# Patient Record
Sex: FEMALE | Race: BLACK OR AFRICAN AMERICAN | NOT HISPANIC OR LATINO | URBAN - METROPOLITAN AREA
[De-identification: names, ages, dates, MRNs, and addresses within clinical notes are randomized per-mention and may not be internally consistent; named-entity substitution may affect disease eponyms.]

---

## 2017-03-01 PROBLEM — Z00.00 ENCOUNTER FOR PREVENTIVE HEALTH EXAMINATION: Status: ACTIVE | Noted: 2017-03-01

## 2017-03-07 ENCOUNTER — INPATIENT (INPATIENT)
Facility: HOSPITAL | Age: 37
LOS: 2 days | Discharge: HOME | DRG: 645 | End: 2017-03-10
Attending: HOSPITALIST | Admitting: HOSPITALIST
Payer: COMMERCIAL

## 2017-03-07 VITALS — WEIGHT: 179.9 LBS | HEIGHT: 61 IN

## 2017-03-07 DIAGNOSIS — E83.42 HYPOMAGNESEMIA: ICD-10-CM

## 2017-03-07 DIAGNOSIS — E05.90 THYROTOXICOSIS, UNSPECIFIED WITHOUT THYROTOXIC CRISIS OR STORM: ICD-10-CM

## 2017-03-07 LAB
ALBUMIN SERPL ELPH-MCNC: 3.6 G/DL — SIGNIFICANT CHANGE UP (ref 3.3–5.2)
ALP SERPL-CCNC: 94 U/L — SIGNIFICANT CHANGE UP (ref 40–120)
ALT FLD-CCNC: 77 U/L — HIGH
ANION GAP SERPL CALC-SCNC: 13 MMOL/L — SIGNIFICANT CHANGE UP (ref 5–17)
APPEARANCE UR: CLEAR — SIGNIFICANT CHANGE UP
AST SERPL-CCNC: 53 U/L — HIGH
BASOPHILS # BLD AUTO: 0 K/UL — SIGNIFICANT CHANGE UP (ref 0–0.2)
BASOPHILS NFR BLD AUTO: 0.2 % — SIGNIFICANT CHANGE UP (ref 0–2)
BILIRUB SERPL-MCNC: 0.3 MG/DL — LOW (ref 0.4–2)
BILIRUB UR-MCNC: NEGATIVE — SIGNIFICANT CHANGE UP
BUN SERPL-MCNC: 13 MG/DL — SIGNIFICANT CHANGE UP (ref 8–20)
CALCIUM SERPL-MCNC: 9.2 MG/DL — SIGNIFICANT CHANGE UP (ref 8.6–10.2)
CHLORIDE SERPL-SCNC: 101 MMOL/L — SIGNIFICANT CHANGE UP (ref 98–107)
CK SERPL-CCNC: 87 U/L — SIGNIFICANT CHANGE UP (ref 25–170)
CO2 SERPL-SCNC: 23 MMOL/L — SIGNIFICANT CHANGE UP (ref 22–29)
COLOR SPEC: YELLOW — SIGNIFICANT CHANGE UP
CREAT SERPL-MCNC: 0.33 MG/DL — LOW (ref 0.5–1.3)
DIFF PNL FLD: ABNORMAL
EOSINOPHIL # BLD AUTO: 0 K/UL — SIGNIFICANT CHANGE UP (ref 0–0.5)
EOSINOPHIL NFR BLD AUTO: 0.3 % — SIGNIFICANT CHANGE UP (ref 0–6)
EPI CELLS # UR: SIGNIFICANT CHANGE UP
GLUCOSE SERPL-MCNC: 136 MG/DL — HIGH (ref 70–115)
GLUCOSE UR QL: NEGATIVE MG/DL — SIGNIFICANT CHANGE UP
HCG UR QL: NEGATIVE — SIGNIFICANT CHANGE UP
HCT VFR BLD CALC: 35.4 % — LOW (ref 37–47)
HGB BLD-MCNC: 11.7 G/DL — LOW (ref 12–16)
KETONES UR-MCNC: NEGATIVE — SIGNIFICANT CHANGE UP
LEUKOCYTE ESTERASE UR-ACNC: NEGATIVE — SIGNIFICANT CHANGE UP
LYMPHOCYTES # BLD AUTO: 2.4 K/UL — SIGNIFICANT CHANGE UP (ref 1–4.8)
LYMPHOCYTES # BLD AUTO: 38.5 % — SIGNIFICANT CHANGE UP (ref 20–55)
MAGNESIUM SERPL-MCNC: 1.5 MG/DL — LOW (ref 1.8–2.5)
MCHC RBC-ENTMCNC: 26.7 PG — LOW (ref 27–31)
MCHC RBC-ENTMCNC: 33.1 G/DL — SIGNIFICANT CHANGE UP (ref 32–36)
MCV RBC AUTO: 80.6 FL — LOW (ref 81–99)
MONOCYTES # BLD AUTO: 0.8 K/UL — SIGNIFICANT CHANGE UP (ref 0–0.8)
MONOCYTES NFR BLD AUTO: 13.1 % — HIGH (ref 3–10)
NEUTROPHILS # BLD AUTO: 2.9 K/UL — SIGNIFICANT CHANGE UP (ref 1.8–8)
NEUTROPHILS NFR BLD AUTO: 47.7 % — SIGNIFICANT CHANGE UP (ref 37–73)
NITRITE UR-MCNC: NEGATIVE — SIGNIFICANT CHANGE UP
PH UR: 6 — SIGNIFICANT CHANGE UP (ref 4.8–8)
PHOSPHATE SERPL-MCNC: 4.8 MG/DL — HIGH (ref 2.4–4.7)
PLATELET # BLD AUTO: 325 K/UL — SIGNIFICANT CHANGE UP (ref 150–400)
POTASSIUM SERPL-MCNC: 4 MMOL/L — SIGNIFICANT CHANGE UP (ref 3.5–5.3)
POTASSIUM SERPL-SCNC: 4 MMOL/L — SIGNIFICANT CHANGE UP (ref 3.5–5.3)
PROT SERPL-MCNC: 7.2 G/DL — SIGNIFICANT CHANGE UP (ref 6.6–8.7)
PROT UR-MCNC: NEGATIVE MG/DL — SIGNIFICANT CHANGE UP
RBC # BLD: 4.39 M/UL — LOW (ref 4.4–5.2)
RBC # FLD: 12 % — SIGNIFICANT CHANGE UP (ref 11–15.6)
RBC CASTS # UR COMP ASSIST: SIGNIFICANT CHANGE UP /HPF (ref 0–4)
SODIUM SERPL-SCNC: 137 MMOL/L — SIGNIFICANT CHANGE UP (ref 135–145)
SP GR SPEC: 1 — LOW (ref 1.01–1.02)
TSH SERPL-MCNC: 0 UIU/ML — SIGNIFICANT CHANGE UP (ref 0.27–4.2)
UROBILINOGEN FLD QL: NEGATIVE MG/DL — SIGNIFICANT CHANGE UP
WBC # BLD: 6.2 K/UL — SIGNIFICANT CHANGE UP (ref 4.8–10.8)
WBC # FLD AUTO: 6.2 K/UL — SIGNIFICANT CHANGE UP (ref 4.8–10.8)
WBC UR QL: NEGATIVE — SIGNIFICANT CHANGE UP

## 2017-03-07 PROCEDURE — 71020: CPT | Mod: 26

## 2017-03-07 PROCEDURE — 93010 ELECTROCARDIOGRAM REPORT: CPT

## 2017-03-07 PROCEDURE — 99285 EMERGENCY DEPT VISIT HI MDM: CPT

## 2017-03-07 RX ORDER — PROPRANOLOL HCL 160 MG
10 CAPSULE, EXTENDED RELEASE 24HR ORAL THREE TIMES A DAY
Qty: 0 | Refills: 0 | Status: DISCONTINUED | OUTPATIENT
Start: 2017-03-07 | End: 2017-03-08

## 2017-03-07 RX ORDER — ATENOLOL 25 MG/1
50 TABLET ORAL ONCE
Qty: 0 | Refills: 0 | Status: COMPLETED | OUTPATIENT
Start: 2017-03-07 | End: 2017-03-07

## 2017-03-07 RX ORDER — MAGNESIUM SULFATE 500 MG/ML
1 VIAL (ML) INJECTION ONCE
Qty: 0 | Refills: 0 | Status: COMPLETED | OUTPATIENT
Start: 2017-03-07 | End: 2017-03-08

## 2017-03-07 RX ADMIN — ATENOLOL 50 MILLIGRAM(S): 25 TABLET ORAL at 20:12

## 2017-03-07 NOTE — H&P ADULT - ASSESSMENT
37 years old female , recently diagnosed with hyperthyroidism admitted with 1 month history of fatigue, weakness, palpitations and exertional dyspnea.

## 2017-03-07 NOTE — ED STATDOCS - MUSCULOSKELETAL, MLM
range of motion is not limited and there is no muscle tenderness. No pedal edema. No proximal muscle weakness.

## 2017-03-07 NOTE — ED STATDOCS - PROGRESS NOTE DETAILS
NP NOTE: Pt seen by intake physician and HPI/orders/plan reviewed and evaluated. PT presenting to ED with complaints of fatigue, weakness, BARTH, hot/cold intolerance over the past month, worsening today. saw her doctor and specialist, told she has hyperthyroidism and placed on atenolol which she did not take today.  PE: GEN: Awake, alert, interactive, NAD, non-toxic appearing. EYES: PERRL, slight exophthalmos, CARDIAC: FAST rate and rhythm, S1,S2, no murmur/rub/gallop. Strong central and peripheral pulses, Brisk cap refill, no evident pedal edema. RESP: No distress noted. L/S clear = Bilat without accessory muscle use, wheeze, ronchi, rales. ABD: soft, supple, non-tender, no guarding. BS x 4, normoactive. NEURO: AOx3, CN II-XII grossly intact without focal deficit. MSK: Moving all extremities with no apparent deformities. 5/5 muscle strength all extremities. SKIN: Warm, dry, normal color, without apparent rashes.  PLAN: labs, ekg, cxr, atenolol, re-eval Labs and imaging results reviewed.  Patient advised of results. Anticipatory guidance provided.   spoke with Dr. Shepherd regarding admission, and accepts patient to his service.

## 2017-03-07 NOTE — H&P ADULT - HISTORY OF PRESENT ILLNESS
36 y/o F presents to the ED complaining of muscle weakness x 1 month, BARTH, dyspnea with talking, headaches and subjective fevers x 15 days, left sided facial pain and left eye pain x 1 day. Pt saw PCP Dr. Machado- Audrey Morgan 2 weeks ago for the same and was dx with hyperthyroidism. She had sonogram by Dr. Alva and was started on atenolol 50mg yesterday. Pt also complains of palpitations and abdominal cramping. LMP was last month. Pt denies heat intolerance, cough, chest pain, dysuria, and vomiting. No other PMHx. Pt denies tobacco use. N 37 years old female with no significant PMH who is recently diagnosed with hyperthyroidism presented to the ER with 1 month history of fatigue, exertional dyspnea and subjective fevers. Patient states she was diagnosed with hyperthyroidism 1 month ago and placed on Atenolol. She was referred to a endocrinologist,  who she saw yesterday and had US of the thyroid gland, result pending. Denies any chest pain, polyphagia, weight loss or diarrhea.

## 2017-03-07 NOTE — ED ADULT NURSE NOTE - CHPI ED SYMPTOMS NEG
no vomiting/no change in level of consciousness/no confusion/no fever/no numbness/no loss of consciousness/no blurred vision/no nausea

## 2017-03-07 NOTE — ED ADULT NURSE REASSESSMENT NOTE - NS ED NURSE REASSESS COMMENT FT1
Assuming care from previous RN, pt AOx3, denies SOB, resp even and unlabored, LS clear and equal bilaterally, skin warm and dry, color good, denies pain, denies n/v, showing sinus tachycardia on monitor @ 100bpm, patent 20G IV in right AC, offers no complaints at this time, will continue to monitor.

## 2017-03-07 NOTE — ED STATDOCS - NS ED MD SCRIBE ATTENDING SCRIBE SECTIONS
DISPOSITION/RESULTS/HISTORY OF PRESENT ILLNESS/REVIEW OF SYSTEMS/HIV/PAST MEDICAL/SURGICAL/SOCIAL HISTORY/VITAL SIGNS( Pullset)/PHYSICAL EXAM

## 2017-03-07 NOTE — ED ADULT TRIAGE NOTE - CHIEF COMPLAINT QUOTE
pt reports muscle weakness x month and sob with walking. pt also c/o headache every night for 15 days and itching when she takes her atenolol.  pt reports hx of thyrois problems pt reports muscle weakness x month and sob with walking. pt also c/o headache every night for 15 days and itching when she takes her atenolol.  pt reports hx of thyroid problems

## 2017-03-07 NOTE — ED ADULT NURSE NOTE - CHIEF COMPLAINT QUOTE
pt reports muscle weakness x month and sob with walking. pt also c/o headache every night for 15 days and itching when she takes her atenolol.  pt reports hx of thyroid problems

## 2017-03-07 NOTE — ED STATDOCS - CARE PLAN
Principal Discharge DX:	Hyperthyroidism Principal Discharge DX:	Hyperthyroidism  Secondary Diagnosis:	Thyrotoxicosis

## 2017-03-07 NOTE — ED STATDOCS - MEDICAL DECISION MAKING DETAILS
Evaluation for complication for hyperthyroidism. Will check CXR, labs, and EKG. Will treat with atenolol now since pt did not take atenolol today. No signs of thyroid storm but sx c/w thyrotoxicosis.  check CXR, labs, and EKG. Will treat with atenolol now since pt did not take atenolol today.  +/- admission.

## 2017-03-07 NOTE — ED STATDOCS - OBJECTIVE STATEMENT
38 y/o F presents to the ED complaining of muscle weakness x 1 month, BARTH, dyspnea with talking, headaches and subjective fevers x 15 days, left sided facial pain and left eye pain x 1 day. Pt saw PCP Dr. Machado- Audrey Morgan 2 weeks ago for the same and was dx with hyperthyroidism. She had sonogram by Dr. Alva and was started on atenolol 50mg yesterday. Pt also complains of palpitations and abdominal cramping. LMP was last month. Pt denies heat intolerance, cough, chest pain, dysuria, and vomiting. No other PMHx. Pt denies tobacco use. No family hx of thyroid disorder. Pt had D&C in November 2016. No further complaints at this time. 38 y/o F presents to the ED complaining of muscle weakness x 1 month, BARTH, dyspnea with talking, headaches and subjective fevers x 15 days, left sided facial pain and left eye pain x 1 day. Pt saw PCP Dr. Jose Morgan 2 weeks ago for the same and was dx with hyperthyroidism. She was evaluated by endocrinology (Dr. Alva) and was started on atenolol 50mg yesterday; had a thyroid US today but does not know the results. Pt also complains of palpitations and abdominal cramping. LMP was last month. Pt denies heat intolerance, cough, chest pain, dysuria, and vomiting. No other PMHx. Pt denies tobacco use. No family hx of thyroid disorder. Pt had D&C in November 2016. No further complaints at this time.

## 2017-03-07 NOTE — ED STATDOCS - ENMT, MLM
Moist mucous membranes. Oropharynx is normal. Throat has no vesicles, no oropharyngeal exudates and uvula is midline. No thyroid tenderness. Mild enlargement.

## 2017-03-07 NOTE — ED STATDOCS - ATTENDING CONTRIBUTION TO CARE
I, Kenny Moreno, performed the initial face to face bedside interview with this patient regarding history of present illness, review of symptoms and relevant past medical, social and family history.  I completed an independent physical examination.  I was the provider who initially evaluated this patient.  The history, relevant review of systems, past medical and surgical history, medical decision making, and physical examination was documented by the scribe in my presence and I attest to the accuracy of the documentation. Follow-up on ordered tests (ie labs, radiologic studies) and re-evaluation of the patient's status has been communicated to the ACP.  Disposition of the patient will be based on test outcome and response to ED interventions.

## 2017-03-07 NOTE — ED ADULT NURSE NOTE - OBJECTIVE STATEMENT
pt presents to ED c/o muscle weakness and sob. pt is a/3, states sx began one month ago and is worse when ambulating. resp even and unlabored. denies cp, n/v/d, fever, chills. no further complaints.

## 2017-03-08 DIAGNOSIS — E05.90 THYROTOXICOSIS, UNSPECIFIED WITHOUT THYROTOXIC CRISIS OR STORM: ICD-10-CM

## 2017-03-08 LAB
MAGNESIUM SERPL-MCNC: 1.7 MG/DL — LOW (ref 1.8–2.5)
T3 SERPL-MCNC: >650 NG/DL — HIGH (ref 80–200)
T3 SERPL-MCNC: >650 NG/DL — SIGNIFICANT CHANGE UP (ref 80–200)
T4 FREE SERPL-MCNC: >7.8 NG/DL — HIGH (ref 0.9–1.8)

## 2017-03-08 PROCEDURE — 76536 US EXAM OF HEAD AND NECK: CPT | Mod: 26

## 2017-03-08 PROCEDURE — 99222 1ST HOSP IP/OBS MODERATE 55: CPT

## 2017-03-08 RX ORDER — METHIMAZOLE 10 MG/1
1 TABLET ORAL
Qty: 0 | Refills: 0 | COMMUNITY
Start: 2017-03-08

## 2017-03-08 RX ORDER — MAGNESIUM OXIDE 400 MG ORAL TABLET 241.3 MG
400 TABLET ORAL DAILY
Qty: 0 | Refills: 0 | Status: DISCONTINUED | OUTPATIENT
Start: 2017-03-08 | End: 2017-03-10

## 2017-03-08 RX ORDER — ATENOLOL 25 MG/1
50 TABLET ORAL DAILY
Qty: 0 | Refills: 0 | Status: DISCONTINUED | OUTPATIENT
Start: 2017-03-08 | End: 2017-03-08

## 2017-03-08 RX ORDER — MAGNESIUM OXIDE 400 MG ORAL TABLET 241.3 MG
1 TABLET ORAL
Qty: 0 | Refills: 0 | COMMUNITY
Start: 2017-03-08

## 2017-03-08 RX ORDER — ATENOLOL 25 MG/1
1 TABLET ORAL
Qty: 60 | Refills: 0 | OUTPATIENT
Start: 2017-03-08 | End: 2017-04-07

## 2017-03-08 RX ORDER — MAGNESIUM SULFATE 500 MG/ML
1 VIAL (ML) INJECTION ONCE
Qty: 0 | Refills: 0 | Status: COMPLETED | OUTPATIENT
Start: 2017-03-08 | End: 2017-03-08

## 2017-03-08 RX ORDER — ATENOLOL 25 MG/1
50 TABLET ORAL
Qty: 0 | Refills: 0 | Status: DISCONTINUED | OUTPATIENT
Start: 2017-03-08 | End: 2017-03-10

## 2017-03-08 RX ADMIN — ATENOLOL 50 MILLIGRAM(S): 25 TABLET ORAL at 05:07

## 2017-03-08 RX ADMIN — ATENOLOL 50 MILLIGRAM(S): 25 TABLET ORAL at 19:08

## 2017-03-08 RX ADMIN — Medication 100 GRAM(S): at 09:51

## 2017-03-08 RX ADMIN — MAGNESIUM OXIDE 400 MG ORAL TABLET 400 MILLIGRAM(S): 241.3 TABLET ORAL at 09:51

## 2017-03-08 RX ADMIN — Medication 100 GRAM(S): at 02:56

## 2017-03-08 NOTE — DISCHARGE NOTE ADULT - PLAN OF CARE
Normal TSH Tapazole 30 mg once a day  Iodine for total of 7 days then stop  Follow up with Endocrinology Normal Magnesium

## 2017-03-08 NOTE — PROGRESS NOTE ADULT - SUBJECTIVE AND OBJECTIVE BOX
PENNY HANNA     Chief Complaint: Patient is a 37y old  Female who presents with a chief complaint of Fatigue and dyspnea (07 Mar 2017 22:46)      HPI: 38 y/o female, no sig PMH, admitted from ED yesterday for further evaluation and treatment hyperthyroidism. Pt came to ED due to symptoms of shaking, weakness/lethargy, BARTH, headaches, decreased appetite, diarrhea,  feeling very hot, palpitations. Pt reports symptoms were happening for a few weeks with worsening course. Pt saw PMD in [place] two weeks ago, was diagnosed with hyperthyroidism and referred to endocrinologist. Dr. Alva sent pt for US and pt was started on Atenolol 50 mg qd. Pt started taking Atenolol yesterday. Pt found to have TSH of 0.00 and T3 > 650. Pt started on Tapazole here. Pt assessed by me in ER. Pt reports no acute events overnight. Pt reports she is feeling a bit better, reports improvement in shaking, palpitations, sob. Pt denies prior personal history thyroid disease, family history thyroid disease, chest pain, dizziness, visual changes	      PAST MEDICAL & SURGICAL HISTORY:  No pertinent past medical history  No significant past surgical history      MEDICATIONS  (STANDING):  methimazole 10milliGRAM(s) Oral three times a day  magnesium oxide 400milliGRAM(s) Oral daily  ATENolol  Tablet 50milliGRAM(s) Oral two times a day      Allergies: propranolol (Rash)      REVIEW OF SYSTEMS:    CONSTITUTIONAL: No fever  ENMT:  No difficulty hearing, tinnitus, vertigo; No sinus or throat pain  NECK: No pain or stiffness  RESPIRATORY: +LISA BARTH, No cough, wheezing, chills or hemoptysis  CARDIOVASCULAR: +LISA palpitations, No chest pain, dizziness, or leg swelling  GASTROINTESTINAL: +LISA diarrhea, +LISA anorexia, No nausea, vomiting, or hematemesis; No melena or hematochezia.  GENITOURINARY: No dysuria, frequency, hematuria, or incontinence  NEUROLOGICAL: +LISA HA, +LISA tremors, no memory loss, no numbness  SKIN: No itching, burning, rashes, or lesions   MUSCULOSKELETAL: +LISA weakness, No joint pain or swelling; No muscle, back, or extremity pain  PSYCHIATRIC: No depression, anxiety, mood swings, or difficulty sleeping    Vital Signs Last 24 Hrs  T(C): 36.8, Max: 37.4 ( @ 00:20)  T(F): 98.2, Max: 99.4 ( @ 00:20)  HR: 96 (93 - 110)  BP: 132/70 (121/61 - 154/86)  BP(mean): 108 (108 - 108)  RR: 18 (18 - 18)  SpO2: 99% (98% - 100%)    General: NAD, sitting up in bed, well groomed in nightgown  Eyes: PERRLA, EOM intact  Neck: nontender goiter noted, supple and symmetrical, no JVD  CV: RRR, no m/r/g  Lungs: CTA b/l, no use of accessory muscles, no wheezes, rales, rhonchi  Skin: warm, dry, no rashes  Psych: normal mood/affect  Abdomen: Normal BS, soft, NT/ND  Extremities: no edema, cyanosis  Musculoskeletal: good strength b/l UE/LE, normal ROM, no joint swelling  Neuro: A & O X 3, CN 2-12 grossly intact, no sensory or motor deficit.       LABS:                        11.7   6.2   )-----------( 325      ( 07 Mar 2017 19:56 )             35.4     07 Mar 2017 19:56    137    |  101    |  13.0   ----------------------------<  136    4.0     |  23.0   |  0.33     Ca    9.2        07 Mar 2017 19:56  Phos  4.8       07 Mar 2017 19:56  Mg     1.7       08 Mar 2017 07:51    TPro  7.2    /  Alb  3.6    /  TBili  0.3    /  DBili  x      /  AST  53     /  ALT  77     /  AlkPhos  94     07 Mar 2017 19:56      Urinalysis Basic - ( 07 Mar 2017 20:35 )    Color: Yellow / Appearance: Clear / S.005 / pH: x  Gluc: x / Ketone: Negative  / Bili: Negative / Urobili: Negative mg/dL   Blood: x / Protein: Negative mg/dL / Nitrite: Negative   Leuk Esterase: Negative / RBC: 0-2 /HPF / WBC Negative   Sq Epi: x / Non Sq Epi: Occasional / Bacteria: x        RADIOLOGY & ADDITIONAL TESTS:    CXR 3/7/17   Findings: The lungs are clear. There are no infiltrates, congestion or   pleural effusions. The pulmonary vasculature and aorta are normal for   age. Heart size is unremarkable. The thorax is normal for age.    Impression: No acute pulmonary disease.          Thyrotoxicosis without thyroid storm: THYROTOXICOSIS, UNSP WITHOUT THYROTOXIC CRISIS OR STORM  No h/o HF: OrderEntryButtonClicked:2017 04:33:06  Unknown h/o HF  No pertinent family history in first degree relatives  Handoff  No pertinent past medical history  Hyperthyroidism  Hypomagnesemia: Hypomagnesemia  Hyperthyroidism: Hyperthyroidism  No significant past surgical history  DIARRHEA HEADACHES: DIARRHEA HEADACHES

## 2017-03-08 NOTE — CONSULT NOTE ADULT - SUBJECTIVE AND OBJECTIVE BOX
HPI:  37 years old female with no significant PMH who is recently diagnosed with hyperthyroidism presented to the ER with 1 month history of fatigue, muscle weakness and shakiness.  She stated that about 1 month ago she felt that she was having the flu and saw her PCP who found her Ft4 levels high.  She was given Propranolol which caused itchiness and then switched to Atenolol.  She saw Dr. Alva (endocrinologist) earlier this week and he was planning on Thyroid uptake and scan.  She is feeling better.      PAST MEDICAL & SURGICAL HISTORY:  No pertinent past medical history  No significant past surgical history      FAMILY HISTORY:  No pertinent family history in first degree relatives      SOCIAL HISTORY: denies EtOH/illicit drugs/tobacco use    REVIEW OF SYSTEMS:  Constitutional:  no change in weight.  Eyes: No eye swelling,no  blurry vision, no double vision. denies eye grittiness  Neck: No neck pain, no change in voice. No dysphagia  Lungs: No shortness of breath, no wheezing, no cough  CV: No chest pain, Recent palpitations,  GI: No nausea, no vomiting, no abdominal pain. recent increase in bowel movements  Musculoskeletal: Muscle weakness  Skin: No rash, no infections.  Neurologic:recent mild dizziness. recent shakiness  Endocrine: pos heat intolerance with increased sweating  Psych: , no anxiety    MEDICATIONS  (STANDING):  methimazole 10milliGRAM(s) Oral three times a day  magnesium oxide 400milliGRAM(s) Oral daily  ATENolol  Tablet 50milliGRAM(s) Oral two times a day  potassium iodide Solution 250milliGRAM(s) Oral every 6 hours    Allergies: propranolol (Rash)    PHYSICAL EXAM:    Vital Signs Last 24 Hrs  T(C): 36.9, Max: 37.4 (03-08 @ 00:20)  T(F): 98.4, Max: 99.4 (03-08 @ 00:20)  HR: 97 (93 - 110)  BP: 140/74 (121/61 - 154/86)  BP(mean): 108 (108 - 108)  RR: 18 (18 - 18)  SpO2: 100% (98% - 100%)    General appearance: Well developed, well nourished.  Eyes: Pupils equal. Bilateral proptosis. No lid lag  Neck: Trachea midline. Mild thyroid enlargement 1.5x normal - soft and nontender.  Pos thyroid bruit  Lungs: Normal respiratory excursion. Lungs clear.  CV: mildly tachycardic S1S2 - regular  Abdomen: Soft, non tender, no organomegaly or mass. Pos bowel sounds  Musculoskeletal: No LE edema  Skin: Warm and moist. No rash.  Neuro: brisk DTRs  Psych: Normal affect, good judgement.    LABS:                        11.7   6.2   )-----------( 325      ( 07 Mar 2017 19:56 )             35.4     07 Mar 2017 19:56    137    |  101    |  13.0   ----------------------------<  136    4.0     |  23.0   |  0.33     Ca    9.2        07 Mar 2017 19:56  Phos  4.8       07 Mar 2017 19:56  Mg     1.7       08 Mar 2017 07:51    TPro  7.2    /  Alb  3.6    /  TBili  0.3    /  DBili  x      /  AST  53     /  ALT  77     /  AlkPhos  94     07 Mar 2017 19:56    Urinalysis Basic - ( 07 Mar 2017 20:35 )    Color: Yellow / Appearance: Clear / S.005 / pH: x  Gluc: x / Ketone: Negative  / Bili: Negative / Urobili: Negative mg/dL   Blood: x / Protein: Negative mg/dL / Nitrite: Negative   Leuk Esterase: Negative / RBC: 0-2 /HPF / WBC Negative   Sq Epi: x / Non Sq Epi: Occasional / Bacteria: x      LIVER FUNCTIONS - ( 07 Mar 2017 19:56 )  Alb: 3.6 g/dL / Pro: 7.2 g/dL / ALK PHOS: 94 U/L / ALT: 77 U/L / AST: 53 U/L / GGT: x             RADIOLOGY & ADDITIONAL STUDIES: HPI:  37 years old female with no significant PMH who is recently diagnosed with hyperthyroidism presented to the ER with 1 month history of fatigue, muscle weakness and shakiness.  She stated that about 1 month ago she felt that she was having the flu and saw her PCP who found her Ft4 levels high.  She was given Propranolol which caused itchiness and then switched to Atenolol.  She saw Dr. Alva (endocrinologist) earlier this week and he was planning on Thyroid uptake and scan.  She is feeling better.      PAST MEDICAL & SURGICAL HISTORY:  No pertinent past medical history  No significant past surgical history      FAMILY HISTORY:  No pertinent family history in first degree relatives      SOCIAL HISTORY: denies EtOH/illicit drugs/tobacco use    REVIEW OF SYSTEMS:  Constitutional:  no change in weight.  Eyes: No eye swelling,no  blurry vision, no double vision. denies eye grittiness  Neck: No neck pain, no change in voice. No dysphagia  Lungs: No shortness of breath, no wheezing, no cough  CV: No chest pain, Recent palpitations,  GI: No nausea, no vomiting, no abdominal pain. recent increase in bowel movements  Musculoskeletal: Muscle weakness  Skin: No rash, no infections.  Neurologic:recent mild dizziness. recent shakiness  Endocrine: pos heat intolerance with increased sweating  Psych: , no anxiety    MEDICATIONS  (STANDING):  methimazole 10milliGRAM(s) Oral three times a day  magnesium oxide 400milliGRAM(s) Oral daily  ATENolol  Tablet 50milliGRAM(s) Oral two times a day  potassium iodide Solution 250milliGRAM(s) Oral every 6 hours    Allergies: propranolol (Rash)    PHYSICAL EXAM:    Vital Signs Last 24 Hrs  T(C): 36.9, Max: 37.4 (03-08 @ 00:20)  T(F): 98.4, Max: 99.4 (03-08 @ 00:20)  HR: 97 (93 - 110)  BP: 140/74 (121/61 - 154/86)  BP(mean): 108 (108 - 108)  RR: 18 (18 - 18)  SpO2: 100% (98% - 100%)    General appearance: Well developed, well nourished.  Eyes: Pupils equal. Bilateral proptosis. No lid lag  Neck: Trachea midline. Mild thyroid enlargement 1.5x normal - soft and nontender.  Pos thyroid bruit  Lungs: Normal respiratory excursion. Lungs clear.  CV: mildly tachycardic S1S2 - regular  Abdomen: Soft, non tender, no organomegaly or mass. Pos bowel sounds  Musculoskeletal: No LE edema  Skin: Warm and moist. No rash.  Neuro: brisk DTRs  Psych: Normal affect, good judgement.    LABS:                        11.7   6.2   )-----------( 325      ( 07 Mar 2017 19:56 )             35.4     07 Mar 2017 19:56    137    |  101    |  13.0   ----------------------------<  136    4.0     |  23.0   |  0.33     Ca    9.2        07 Mar 2017 19:56  Phos  4.8       07 Mar 2017 19:56  Mg     1.7       08 Mar 2017 07:51    TPro  7.2    /  Alb  3.6    /  TBili  0.3    /  DBili  x      /  AST  53     /  ALT  77     /  AlkPhos  94     07 Mar 2017 19:56    Urinalysis Basic - ( 07 Mar 2017 20:35 )    Color: Yellow / Appearance: Clear / S.005 / pH: x  Gluc: x / Ketone: Negative  / Bili: Negative / Urobili: Negative mg/dL   Blood: x / Protein: Negative mg/dL / Nitrite: Negative   Leuk Esterase: Negative / RBC: 0-2 /HPF / WBC Negative   Sq Epi: x / Non Sq Epi: Occasional / Bacteria: x      LIVER FUNCTIONS - ( 07 Mar 2017 19:56 )  Alb: 3.6 g/dL / Pro: 7.2 g/dL / ALK PHOS: 94 U/L / ALT: 77 U/L / AST: 53 U/L / GGT: x             RADIOLOGY & ADDITIONAL STUDIES:  Thyroid sonogram -heterogeneous  thyroid with increased vascularity

## 2017-03-08 NOTE — PROGRESS NOTE ADULT - PROBLEM SELECTOR PLAN 2
Repeat level has improved to 1.7. Will replete again today. Repeat level has improved to 1.7. Repleted again today. Repeat CMP tomorrow am.

## 2017-03-08 NOTE — DISCHARGE NOTE ADULT - CARE PLAN
Principal Discharge DX:	Hyperthyroidism  Goal:	Normal TSH  Instructions for follow-up, activity and diet:	Tapazole 30 mg once a day  Iodine for total of 7 days then stop  Follow up with Endocrinology  Secondary Diagnosis:	Hypomagnesemia  Goal:	Normal Magnesium

## 2017-03-08 NOTE — CONSULT NOTE ADULT - ASSESSMENT
37 year old female with thyrotoxicosis likely due to Graves Disease.  Differential diagnosis includes thyroiditis, although unlikely due to signs and symptoms.  Thyroid sonogram did not show thyroid nodules.  She is clinically and biochemically hyperthyroid and started on Methimazole and iodine today.  - check TSI level, TFTs tomorrow  - further work up with thyroid uptake and scan needed (can be done as outpatient), pt advised to hold Methimazole prior to thyroid uptake and scan  - cont Methimazole and Atenolol

## 2017-03-08 NOTE — PROGRESS NOTE ADULT - PROBLEM SELECTOR PLAN 1
TSH of 0.00, T3 > 650, T4 pending. Endocrinology consult is pending. Increase Atenolol to bid as pt's HR has remained high 90s- low 100s. Continue with Tapazole. Continue to monitor patient symptoms and vital signs. Thyroid US pending. TSH of 0.00, T3 > 650, T4 pending. Endocrinology consult is pending. Increase Atenolol to bid as pt's HR has remained high 90s- low 100s. Continue with Tapazole. Continue to monitor patient symptoms and vital signs. Thyroid US pending.  Add SSKI iodine for total of 7 days.

## 2017-03-08 NOTE — PROGRESS NOTE ADULT - ASSESSMENT
37 year old female,recently diagnosed with hyperthyroidism admitted for further evaluation and treatment hyperthyroidism after presenting to ED after with 1 month history of fatigue, weakness, palpitations, BARTH, diarrhea, decreased appetite, shaking tremors

## 2017-03-08 NOTE — DISCHARGE NOTE ADULT - NS AS ACTIVITY OBS
Return to Work/School allowed/only after follow up with PMD next week 3/14/17 , needs to be cleared by PMD prior to return to work

## 2017-03-08 NOTE — DISCHARGE NOTE ADULT - HOSPITAL COURSE
37 years old female with no significant PMH who is recently diagnosed with hyperthyroidism presented to the ER with 1 month history of fatigue, exertional dyspnea and subjective fevers. Patient states she was diagnosed with hyperthyroidism 1 month ago and placed on Atenolol. She was referred to a endocrinologist,  who she saw yesterday and had US of the thyroid gland, result pending. Denies any chest pain, polyphagia, weight loss or diarrhea. 37 years old female with no significant PMH who is recently diagnosed with hyperthyroidism presented to the ER with 1 month history of fatigue, exertional dyspnea and subjective fevers. Patient states she was diagnosed with hyperthyroidism 1 month ago and placed on Atenolol. She was referred to a endocrinologist,  who she saw yesterday and had US of the thyroid gland, result pending. Denies any chest pain, polyphagia, weight loss or diarrhea.  Patient had T3 level greater than 600.   ethimazole 10 mg 3 times a day was started. She can go home on 30 mg once a day. She will require iodine therapy for a total of 7 days. She will need to follow up closely with her outpatient endocrinologist. 37 years old female with no significant PMH who is recently diagnosed with hyperthyroidism presented to the ER with 1 month history of fatigue, exertional dyspnea and subjective fevers. Patient states she was diagnosed with hyperthyroidism 1 month ago and placed on Atenolol. She was referred to a endocrinologist,  who she saw yesterday and had US of the thyroid gland, result pending. Denies any chest pain, polyphagia, weight loss or diarrhea.  Patient had T3 level greater than 600. ethimazole 10 mg 3 times a day was started. She can go home on 30 mg once a day. She will require iodine therapy for a total of 7 days. She will need to follow up closely with her outpatient endocrinologist. 37 years old female with no significant PMH who is recently diagnosed with hyperthyroidism presented to the ER with 1 month history of fatigue, exertional dyspnea and subjective fevers. Patient states she was diagnosed with hyperthyroidism 1 month ago and placed on Atenolol. She was referred to a endocrinologist,  who she saw yesterday and had US of the thyroid gland, result pending. Denies any chest pain, polyphagia, weight loss or diarrhea.  Patient had T3 level greater than 600. ethimazole 10 mg 3 times a day was started. She can go home on 30 mg once a day. She will require iodine therapy for a total of 7 days. She will need to follow up closely with her outpatient endocrinologist. Patient understands her     GENERAL: NAD,   HEAD:  Atraumatic, Normocephalic  EYES: EOMI, PERRLA, conjunctiva and sclera clear  ENMT: No tonsillar erythema, exudates, or enlargement; Moist mucous membranes,  NECK: Supple, No JVD,  NERVOUS SYSTEM:  Alert & Oriented X3, Good concentration; Motor Strength 5/5 B/L upper and lower extremities;   LUNG: Clear to percussion bilaterally; No rales, rhonchi, wheezing, or rubs  HEART: Regular rate and rhythm; No murmurs, rubs, or gallops  ABDOMEN: Soft, Nontender, Nondistended; Bowel sounds present  EXTREMITIES:  2+ Peripheral Pulses, No clubbing, cyanosis, or edema  LYMPH: No lymphadenopathy noted 37 years old female with no significant PMH who is recently diagnosed with hyperthyroidism presented to the ER with 1 month history of fatigue, exertional dyspnea and subjective fevers. Patient states she was diagnosed with hyperthyroidism 1 month ago and placed on Atenolol. She was referred to a endocrinologist,  who she saw yesterday and had US of the thyroid gland, result pending. Denies any chest pain, polyphagia, weight loss or diarrhea.  Patient had T3 level greater than 600. ethimazole 10 mg 3 times a day was started. She can go home on 30 mg once a day. She will require iodine therapy for a total of 7 days. She will need to follow up closely with her outpatient endocrinologist. Patient understands her follow up, i have discussed today with Dr bill Barahona endocrinology and she does not recommend repeat T3 here in house but patient is to follow up Monday or Tuesday with her endocrinologist out patient     GENERAL: NAD,   HEAD:  Atraumatic, Normocephalic  EYES: EOMI, PERRLA, conjunctiva and sclera clear  ENMT: No tonsillar erythema, exudates, or enlargement; Moist mucous membranes,  NECK: Supple, No JVD,  NERVOUS SYSTEM:  Alert & Oriented X3, Good concentration; Motor Strength 5/5 B/L upper and lower extremities;   LUNG: Clear to percussion bilaterally; No rales, rhonchi, wheezing, or rubs  HEART: Regular rate and rhythm; No murmurs, rubs, or gallops  ABDOMEN: Soft, Nontender, Nondistended; Bowel sounds present  EXTREMITIES:  2+ Peripheral Pulses, No clubbing, cyanosis, or edema  LYMPH: No lymphadenopathy noted 37 years old female with no significant PMH who is recently diagnosed with hyperthyroidism presented to the ER with 1 month history of fatigue, exertional dyspnea and subjective fevers. Patient states she was diagnosed with hyperthyroidism 1 month ago and placed on Atenolol. She was referred to a endocrinologist,  who she saw yesterday and had US of the thyroid gland, result pending. Denies any chest pain, polyphagia, weight loss or diarrhea.  Patient had T3 level greater than 600. ethimazole 10 mg 3 times a day was started. She can go home on 30 mg once a day. She will require iodine therapy for a total of 7 days. She will need to follow up closely with her outpatient endocrinologist. Patient understands her follow up, i have discussed today with Dr bill Barahona endocrinology and she does not recommend repeat T3 here in house but patient is to follow up Monday or Tuesday with her endocrinologist out patient and get follow up comprehensive metabolic panel and tyroid function tests    GENERAL: NAD,   HEAD:  Atraumatic, Normocephalic  EYES: EOMI, PERRLA, conjunctiva and sclera clear  ENMT: No tonsillar erythema, exudates, or enlargement; Moist mucous membranes,  NECK: Supple, No JVD,  NERVOUS SYSTEM:  Alert & Oriented X3, Good concentration; Motor Strength 5/5 B/L upper and lower extremities;   LUNG: Clear to percussion bilaterally; No rales, rhonchi, wheezing, or rubs  HEART: Regular rate and rhythm; No murmurs, rubs, or gallops  ABDOMEN: Soft, Nontender, Nondistended; Bowel sounds present  EXTREMITIES:  2+ Peripheral Pulses, No clubbing, cyanosis, or edema  LYMPH: No lymphadenopathy noted

## 2017-03-08 NOTE — DISCHARGE NOTE ADULT - CARE PROVIDER_API CALL
Brenda Barahona (DO), EndocrinologyMetabDiabetes; Internal Medicine  [facility] [place] [address]  Phone: [phone]  Fax: [phone]

## 2017-03-08 NOTE — DISCHARGE NOTE ADULT - MEDICATION SUMMARY - MEDICATIONS TO TAKE
I will START or STAY ON the medications listed below when I get home from the hospital:    methIMAzole 10 mg oral tablet  -- 1 tab(s) by mouth 3 times a day  -- Indication: For Thyrotoxicosis without thyroid storm    atenolol 50 mg oral tablet  -- 1 tab(s) by mouth 2 times a day  -- Indication: For Thyrotoxicosis without thyroid storm    magnesium oxide 400 mg (241.3 mg elemental magnesium) oral tablet  -- 1 tab(s) by mouth once a day  -- Indication: For Hypomagnesemia I will START or STAY ON the medications listed below when I get home from the hospital:    methIMAzole 10 mg oral tablet  -- 1 tab(s) by mouth 3 times a day  -- Indication: For Thyrotoxicosis without thyroid storm    potassium iodide 1 g/mL oral solution  -- 0.25 milliliter(s) by mouth every 6 hours  -- Indication: For Thyrotoxicosis without thyroid storm    atenolol 50 mg oral tablet  -- 1 tab(s) by mouth 2 times a day  -- Indication: For Thyrotoxicosis without thyroid storm    magnesium oxide 400 mg (241.3 mg elemental magnesium) oral tablet  -- 1 tab(s) by mouth once a day  -- Indication: For Hypomagnesemia

## 2017-03-08 NOTE — DISCHARGE NOTE ADULT - PATIENT PORTAL LINK FT
“You can access the FollowHealth Patient Portal, offered by [facility], by registering with the following website: [email or web address]”

## 2017-03-09 LAB
ALBUMIN SERPL ELPH-MCNC: 3.1 G/DL — LOW (ref 3.3–5.2)
ALP SERPL-CCNC: 82 U/L — SIGNIFICANT CHANGE UP (ref 40–120)
ALT FLD-CCNC: 61 U/L — HIGH
ANION GAP SERPL CALC-SCNC: 13 MMOL/L — SIGNIFICANT CHANGE UP (ref 5–17)
AST SERPL-CCNC: 42 U/L — HIGH
BASOPHILS # BLD AUTO: 0 K/UL — SIGNIFICANT CHANGE UP (ref 0–0.2)
BASOPHILS NFR BLD AUTO: 0.2 % — SIGNIFICANT CHANGE UP (ref 0–2)
BILIRUB DIRECT SERPL-MCNC: 0.1 MG/DL — SIGNIFICANT CHANGE UP (ref 0–0.3)
BILIRUB INDIRECT FLD-MCNC: 0.3 MG/DL — SIGNIFICANT CHANGE UP (ref 0.2–1)
BILIRUB SERPL-MCNC: 0.4 MG/DL — SIGNIFICANT CHANGE UP (ref 0.4–2)
BUN SERPL-MCNC: 12 MG/DL — SIGNIFICANT CHANGE UP (ref 8–20)
CALCIUM SERPL-MCNC: 9.4 MG/DL — SIGNIFICANT CHANGE UP (ref 8.6–10.2)
CHLORIDE SERPL-SCNC: 103 MMOL/L — SIGNIFICANT CHANGE UP (ref 98–107)
CO2 SERPL-SCNC: 23 MMOL/L — SIGNIFICANT CHANGE UP (ref 22–29)
CREAT SERPL-MCNC: 0.23 MG/DL — LOW (ref 0.5–1.3)
EOSINOPHIL # BLD AUTO: 0 K/UL — SIGNIFICANT CHANGE UP (ref 0–0.5)
EOSINOPHIL NFR BLD AUTO: 0.4 % — SIGNIFICANT CHANGE UP (ref 0–6)
GLUCOSE SERPL-MCNC: 106 MG/DL — SIGNIFICANT CHANGE UP (ref 70–115)
HCT VFR BLD CALC: 33.2 % — LOW (ref 37–47)
HGB BLD-MCNC: 10.9 G/DL — LOW (ref 12–16)
LYMPHOCYTES # BLD AUTO: 2 K/UL — SIGNIFICANT CHANGE UP (ref 1–4.8)
LYMPHOCYTES # BLD AUTO: 39.8 % — SIGNIFICANT CHANGE UP (ref 20–55)
MCHC RBC-ENTMCNC: 26.7 PG — LOW (ref 27–31)
MCHC RBC-ENTMCNC: 32.8 G/DL — SIGNIFICANT CHANGE UP (ref 32–36)
MCV RBC AUTO: 81.2 FL — SIGNIFICANT CHANGE UP (ref 81–99)
MONOCYTES # BLD AUTO: 0.8 K/UL — SIGNIFICANT CHANGE UP (ref 0–0.8)
MONOCYTES NFR BLD AUTO: 16.1 % — HIGH (ref 3–10)
NEUTROPHILS # BLD AUTO: 2.2 K/UL — SIGNIFICANT CHANGE UP (ref 1.8–8)
NEUTROPHILS NFR BLD AUTO: 43.3 % — SIGNIFICANT CHANGE UP (ref 37–73)
PLATELET # BLD AUTO: 313 K/UL — SIGNIFICANT CHANGE UP (ref 150–400)
POTASSIUM SERPL-MCNC: 3.9 MMOL/L — SIGNIFICANT CHANGE UP (ref 3.5–5.3)
POTASSIUM SERPL-SCNC: 3.9 MMOL/L — SIGNIFICANT CHANGE UP (ref 3.5–5.3)
PROT SERPL-MCNC: 6.5 G/DL — LOW (ref 6.6–8.7)
RBC # BLD: 4.09 M/UL — LOW (ref 4.4–5.2)
RBC # FLD: 12.2 % — SIGNIFICANT CHANGE UP (ref 11–15.6)
SODIUM SERPL-SCNC: 139 MMOL/L — SIGNIFICANT CHANGE UP (ref 135–145)
T3 SERPL-MCNC: 471 NG/DL — HIGH (ref 80–200)
WBC # BLD: 5 K/UL — SIGNIFICANT CHANGE UP (ref 4.8–10.8)
WBC # FLD AUTO: 5 K/UL — SIGNIFICANT CHANGE UP (ref 4.8–10.8)

## 2017-03-09 PROCEDURE — 99233 SBSQ HOSP IP/OBS HIGH 50: CPT

## 2017-03-09 RX ORDER — ACETAMINOPHEN 500 MG
650 TABLET ORAL EVERY 6 HOURS
Qty: 0 | Refills: 0 | Status: DISCONTINUED | OUTPATIENT
Start: 2017-03-09 | End: 2017-03-10

## 2017-03-09 RX ADMIN — MAGNESIUM OXIDE 400 MG ORAL TABLET 400 MILLIGRAM(S): 241.3 TABLET ORAL at 11:10

## 2017-03-09 RX ADMIN — ATENOLOL 50 MILLIGRAM(S): 25 TABLET ORAL at 05:36

## 2017-03-09 RX ADMIN — Medication 250 MILLIGRAM(S): at 17:07

## 2017-03-09 RX ADMIN — Medication 250 MILLIGRAM(S): at 23:28

## 2017-03-09 RX ADMIN — Medication 650 MILLIGRAM(S): at 12:30

## 2017-03-09 RX ADMIN — Medication 250 MILLIGRAM(S): at 00:07

## 2017-03-09 RX ADMIN — Medication 650 MILLIGRAM(S): at 11:10

## 2017-03-09 RX ADMIN — Medication 250 MILLIGRAM(S): at 11:10

## 2017-03-09 RX ADMIN — ATENOLOL 50 MILLIGRAM(S): 25 TABLET ORAL at 17:07

## 2017-03-09 RX ADMIN — Medication 250 MILLIGRAM(S): at 05:36

## 2017-03-09 NOTE — PROGRESS NOTE ADULT - ASSESSMENT
37 year old female,recently diagnosed with hyperthyroidism admitted for further evaluation and treatment hyperthyroidism after presenting to ED after with 1 month history of fatigue, weakness, palpitations, BARTH, diarrhea, decreased appetite, shaking tremors-

## 2017-03-09 NOTE — PROGRESS NOTE ADULT - PROBLEM SELECTOR PLAN 1
TSH of 0.00, T3 > 650, T4 pending. Endocrinology consult appreciated, Increase Atenolol to bid as pt's HR has remained high 90s- low 100s. Continue with Tapazole. Continue to monitor patient symptoms and vital signs. Thyroid US   Add SSKI iodine for total of 7 days.

## 2017-03-09 NOTE — PROGRESS NOTE ADULT - SUBJECTIVE AND OBJECTIVE BOX
PENNY HANNA Patient is a 37y old  Female who presents with a chief complaint of Fatigue and dyspnea (08 Mar 2017 15:56)     HPI:  37 years old female with no significant PMH who is recently diagnosed with hyperthyroidism presented to the ER with 1 month history of fatigue, exertional dyspnea and subjective fevers. Patient states she was diagnosed with hyperthyroidism 1 month ago and placed on Atenolol. She was referred to a endocrinologist,  who she saw yesterday and had US of the thyroid gland, result pending. Denies any chest pain, polyphagia, weight loss or diarrhea. (07 Mar 2017 22:46)    The patient was seen and evaluated thyrotoxicosis   The patient is in no acute distress.  Denied any fever chest pain, palpitations, shortness of breath, abdominal pain, fever, dysuria, cough, edema   Complains of being dizzy and lightheaded and still has     I&O's Summary    I & Os for current day (as of 09 Mar 2017 15:29)  =============================================  IN: 360 ml / OUT: 0 ml / NET: 360 ml    Allergies    propranolol (Rash)    Intolerances      HEALTH ISSUES - PROBLEM Dx:  Hypomagnesemia: Hypomagnesemia  Hyperthyroidism: Hyperthyroidism        PAST MEDICAL & SURGICAL HISTORY:  No pertinent past medical history  No significant past surgical history          Vital Signs Last 24 Hrs  T(C): 37, Max: 37.1 (03-08 @ 18:07)  T(F): 98.6, Max: 98.7 (03-08 @ 18:07)  HR: 84 (77 - 84)  BP: 120/66 (120/66 - 135/61)  BP(mean): --  RR: 18 (18 - 18)  SpO2: --T(C): 37, Max: 37.1 (03-08 @ 18:07)  HR: 84 (77 - 84)  BP: 120/66 (120/66 - 135/61)  RR: 18 (18 - 18)  SpO2: --  Wt(kg): --    PHYSICAL EXAM:    GENERAL: NAD, well-groomed, well-developed  HEAD:  Atraumatic, Normocephalic  EYES: EOMI, PERRLA, conjunctiva and sclera clear  ENMT:  Moist mucous membranes,  No lesions  NECK: Supple, No JVD, Normal thyroid  NERVOUS SYSTEM:  Alert & Oriented X3,  Moves upper and lower extremities  CHEST/LUNG: Clear to auscultation bilaterally; No rales, rhonchi, wheezing,   HEART: Regular rate and rhythm; No murmurs,   ABDOMEN: Soft, Nontender, Nondistended; Bowel sounds present  EXTREMITIES:  Peripheral Pulses, No  cyanosis, or edema  SKIN: No rashes or lesions    methimazole 10milliGRAM(s) Oral three times a day  magnesium oxide 400milliGRAM(s) Oral daily  ATENolol  Tablet 50milliGRAM(s) Oral two times a day  potassium iodide Solution 250milliGRAM(s) Oral every 6 hours  acetaminophen   Tablet. 650milliGRAM(s) Oral every 6 hours PRN      LABS:                          10.9   5.0   )-----------( 313      ( 09 Mar 2017 05:37 )             33.2     09 Mar 2017 05:37    139    |  103    |  12.0   ----------------------------<  106    3.9     |  23.0   |  0.23     Ca    9.4        09 Mar 2017 05:37  Phos  4.8       07 Mar 2017 19:56  Mg     1.7       08 Mar 2017 07:51    TPro  6.5    /  Alb  3.1    /  TBili  0.4    /  DBili  0.1    /  AST  42     /  ALT  61     /  AlkPhos  82     09 Mar 2017 05:37    LIVER FUNCTIONS - ( 09 Mar 2017 05:37 )  Alb: 3.1 g/dL / Pro: 6.5 g/dL / ALK PHOS: 82 U/L / ALT: 61 U/L / AST: 42 U/L / GGT: x             CARDIAC MARKERS ( 07 Mar 2017 19:56 )  x     / x     / 87 U/L / x     / x          Urinalysis Basic - ( 07 Mar 2017 20:35 )    Color: Yellow / Appearance: Clear / S.005 / pH: x  Gluc: x / Ketone: Negative  / Bili: Negative / Urobili: Negative mg/dL   Blood: x / Protein: Negative mg/dL / Nitrite: Negative   Leuk Esterase: Negative / RBC: 0-2 /HPF / WBC Negative   Sq Epi: x / Non Sq Epi: Occasional / Bacteria: x      CAPILLARY BLOOD GLUCOSE      RADIOLOGY & ADDITIONAL TESTS:    IMPRESSION thyroid US     Heterogeneous thyroid with diffusely increased vascularity. Findings are   consistent with a thyroiditis. Correlation with thyroid function tests is  advised.  Consultant notes reviewed    Case discussed with consultant/provider/ family /patient

## 2017-03-10 VITALS — DIASTOLIC BLOOD PRESSURE: 68 MMHG | SYSTOLIC BLOOD PRESSURE: 131 MMHG | HEART RATE: 82 BPM | TEMPERATURE: 97 F

## 2017-03-10 PROCEDURE — 99239 HOSP IP/OBS DSCHRG MGMT >30: CPT

## 2017-03-10 PROCEDURE — 84439 ASSAY OF FREE THYROXINE: CPT

## 2017-03-10 PROCEDURE — 36415 COLL VENOUS BLD VENIPUNCTURE: CPT

## 2017-03-10 PROCEDURE — 99285 EMERGENCY DEPT VISIT HI MDM: CPT

## 2017-03-10 PROCEDURE — 76536 US EXAM OF HEAD AND NECK: CPT

## 2017-03-10 PROCEDURE — 81025 URINE PREGNANCY TEST: CPT

## 2017-03-10 PROCEDURE — 81001 URINALYSIS AUTO W/SCOPE: CPT

## 2017-03-10 PROCEDURE — 71046 X-RAY EXAM CHEST 2 VIEWS: CPT

## 2017-03-10 PROCEDURE — 80053 COMPREHEN METABOLIC PANEL: CPT

## 2017-03-10 PROCEDURE — 82550 ASSAY OF CK (CPK): CPT

## 2017-03-10 PROCEDURE — 84100 ASSAY OF PHOSPHORUS: CPT

## 2017-03-10 PROCEDURE — 80048 BASIC METABOLIC PNL TOTAL CA: CPT

## 2017-03-10 PROCEDURE — 93005 ELECTROCARDIOGRAM TRACING: CPT

## 2017-03-10 PROCEDURE — 85027 COMPLETE CBC AUTOMATED: CPT

## 2017-03-10 PROCEDURE — 83735 ASSAY OF MAGNESIUM: CPT

## 2017-03-10 PROCEDURE — 84443 ASSAY THYROID STIM HORMONE: CPT

## 2017-03-10 PROCEDURE — 84445 ASSAY OF TSI GLOBULIN: CPT

## 2017-03-10 PROCEDURE — 84480 ASSAY TRIIODOTHYRONINE (T3): CPT

## 2017-03-10 PROCEDURE — 80076 HEPATIC FUNCTION PANEL: CPT

## 2017-03-10 RX ORDER — METHIMAZOLE 10 MG/1
1 TABLET ORAL
Qty: 90 | Refills: 0 | OUTPATIENT
Start: 2017-03-10 | End: 2017-04-09

## 2017-03-10 RX ORDER — MAGNESIUM OXIDE 400 MG ORAL TABLET 241.3 MG
1 TABLET ORAL
Qty: 3 | Refills: 0 | OUTPATIENT
Start: 2017-03-10 | End: 2017-03-13

## 2017-03-10 RX ORDER — ATENOLOL 25 MG/1
1 TABLET ORAL
Qty: 60 | Refills: 0 | OUTPATIENT
Start: 2017-03-10 | End: 2017-04-09

## 2017-03-10 RX ADMIN — Medication 250 MILLIGRAM(S): at 14:31

## 2017-03-10 RX ADMIN — Medication 250 MILLIGRAM(S): at 05:23

## 2017-03-10 RX ADMIN — Medication 250 MILLIGRAM(S): at 17:43

## 2017-03-10 RX ADMIN — ATENOLOL 50 MILLIGRAM(S): 25 TABLET ORAL at 05:23

## 2017-03-10 RX ADMIN — MAGNESIUM OXIDE 400 MG ORAL TABLET 400 MILLIGRAM(S): 241.3 TABLET ORAL at 14:31

## 2017-03-10 RX ADMIN — ATENOLOL 50 MILLIGRAM(S): 25 TABLET ORAL at 17:43

## 2017-03-10 NOTE — PROGRESS NOTE ADULT - ASSESSMENT
37 year old female,recently diagnosed with hyperthyroidism admitted for further evaluation and treatment hyperthyroidism after presenting to ED after with 1 month history of fatigue, weakness, palpitations, BARTH, diarrhea, decreased appetite, shaking tremors- patient treated for thyrotoxicosis now symtoms have resolved and is cleared by endocrine to be DC home and follow up out patient for TFTs and comprehensive metabolic panel

## 2017-03-10 NOTE — PROGRESS NOTE ADULT - ASSESSMENT
37 year old female admitted with thyrotoxicosis likely due to Graves Disease..  Thyroid sonogram did not show thyroid nodules.  She is clinically and biochemically hyperthyroid and started on Methimazole and iodine. T3 levels elevated but improving.  Pt also with improved signs and symptoms  - further work up can be done as outpatient  - further work up with thyroid uptake and scan needed (can be done as outpatient, per pt already set up by outpt endocrinologist), pt advised to hold Methimazole prior to thyroid uptake and scan  - cont Methimazole and Atenolol  - can follow up as outpatient

## 2017-03-10 NOTE — PROGRESS NOTE ADULT - SUBJECTIVE AND OBJECTIVE BOX
PENNY HANNA Patient is a 37y old  Female who presents with a chief complaint of Fatigue and dyspnea (08 Mar 2017 15:56)     HPI:  37 years old female with no significant PMH who is recently diagnosed with hyperthyroidism presented to the ER with 1 month history of fatigue, exertional dyspnea and subjective fevers. Patient states she was diagnosed with hyperthyroidism 1 month ago and placed on Atenolol. She was referred to a endocrinologist,  who she saw yesterday and had US of the thyroid gland, result pending. Denies any chest pain, polyphagia, weight loss or diarrhea. (07 Mar 2017 22:46)    The patient was seen and evaluated   The patient is in no acute distress.  Denied any fever chest pain, palpitations, shortness of breath, abdominal pain, fever, dysuria, cough, edema       I&O's Summary    Allergies    propranolol (Rash)    Intolerances      HEALTH ISSUES - PROBLEM Dx:  Hypomagnesemia: Hypomagnesemia  Hyperthyroidism: Hyperthyroidism        PAST MEDICAL & SURGICAL HISTORY:  No pertinent past medical history  No significant past surgical history          Vital Signs Last 24 Hrs  T(C): 36.2, Max: 36.9 (03-09 @ 23:30)  T(F): 97.2, Max: 98.4 (03-09 @ 23:30)  HR: 82 (80 - 82)  BP: 131/68 (131/68 - 147/68)  BP(mean): --  RR: 18 (18 - 18)  SpO2: --T(C): 36.2, Max: 36.9 (03-09 @ 23:30)  HR: 82 (80 - 82)  BP: 131/68 (131/68 - 147/68)  RR: 18 (18 - 18)  SpO2: --  Wt(kg): --    PHYSICAL EXAM:    GENERAL: NAD, well-groomed, well-developed  HEAD:  Atraumatic, Normocephalic  EYES: EOMI, PERRLA, conjunctiva and sclera clear  ENMT:  Moist mucous membranes,  No lesions  NECK: Supple, No JVD, Normal thyroid  NERVOUS SYSTEM:  Alert & Oriented X3,  Moves upper and lower extremities; DTRs 2+ intact and symmetric  CHEST/LUNG: Clear to auscultation bilaterally; No rales, rhonchi, wheezing,   HEART: Regular rate and rhythm; No murmurs,   ABDOMEN: Soft, Nontender, Nondistended; Bowel sounds present  EXTREMITIES:  Peripheral Pulses, No  cyanosis, or edema  SKIN: No rashes or lesions    methimazole 10milliGRAM(s) Oral three times a day  magnesium oxide 400milliGRAM(s) Oral daily  ATENolol  Tablet 50milliGRAM(s) Oral two times a day  potassium iodide Solution 250milliGRAM(s) Oral every 6 hours  acetaminophen   Tablet. 650milliGRAM(s) Oral every 6 hours PRN      LABS:                          10.9   5.0   )-----------( 313      ( 09 Mar 2017 05:37 )             33.2     09 Mar 2017 05:37    139    |  103    |  12.0   ----------------------------<  106    3.9     |  23.0   |  0.23     Ca    9.4        09 Mar 2017 05:37    TPro  6.5    /  Alb  3.1    /  TBili  0.4    /  DBili  0.1    /  AST  42     /  ALT  61     /  AlkPhos  82     09 Mar 2017 05:37    LIVER FUNCTIONS - ( 09 Mar 2017 05:37 )  Alb: 3.1 g/dL / Pro: 6.5 g/dL / ALK PHOS: 82 U/L / ALT: 61 U/L / AST: 42 U/L / GGT: x                   CAPILLARY BLOOD GLUCOSE      RADIOLOGY & ADDITIONAL TESTS:      Consultant notes reviewed    Case discussed with consultant/provider/ family /patient

## 2017-03-14 LAB — TSI ACT/NOR SER: 5.7 TSI INDEX — HIGH

## 2017-04-03 RX ORDER — ATENOLOL 25 MG/1
0 TABLET ORAL
Qty: 0 | Refills: 0 | COMMUNITY

## 2017-04-21 ENCOUNTER — APPOINTMENT (OUTPATIENT)
Dept: ENDOCRINOLOGY | Facility: CLINIC | Age: 37
End: 2017-04-21

## 2017-10-18 ENCOUNTER — RESULT REVIEW (OUTPATIENT)
Age: 37
End: 2017-10-18

## 2017-10-30 ENCOUNTER — TRANSCRIPTION ENCOUNTER (OUTPATIENT)
Age: 37
End: 2017-10-30

## 2017-11-08 ENCOUNTER — RESULT REVIEW (OUTPATIENT)
Age: 37
End: 2017-11-08

## 2017-11-08 ENCOUNTER — TRANSCRIPTION ENCOUNTER (OUTPATIENT)
Age: 37
End: 2017-11-08

## 2018-10-27 NOTE — PROGRESS NOTE ADULT - SUBJECTIVE AND OBJECTIVE BOX
INTERVAL HPI/OVERNIGHT EVENTS: feeling better, still complains of slight muscle weakness but appetite improved and has decreased palpitations.  stated that she will be leaving today    MEDICATIONS  (STANDING):  methimazole 10milliGRAM(s) Oral three times a day  magnesium oxide 400milliGRAM(s) Oral daily  ATENolol  Tablet 50milliGRAM(s) Oral two times a day  potassium iodide Solution 250milliGRAM(s) Oral every 6 hours    MEDICATIONS  (PRN):  acetaminophen   Tablet. 650milliGRAM(s) Oral every 6 hours PRN Moderate Pain (4 - 6)      Allergies: propranolol (Rash)    Review of systems:    Vital Signs Last 24 Hrs  T(C): 36.2, Max: 36.9 (03-09 @ 23:30)  T(F): 97.2, Max: 98.4 (03-09 @ 23:30)  HR: 82 (80 - 82)  BP: 131/68 (131/68 - 138/80)  BP(mean): --  RR: 18 (18 - 18)  SpO2: --    PHYSICAL EXAM:  Constitutional: NAD, well-groomed, well-developed  HEENT: Pupils equal. bilateral proptosis, EOMI, decreased convergence Left eye. No lid lag  Neck: TDiffuse thyroid enlargement 1.5x normal, soft, nontender. mild thyroid bruit  Respiratory: CTAB, no w/r/r  Cardiovascular: S1 and S2 - regular rate  Gastrointestinal: BS+, soft, no organomegaly  Extremities: No peripheral edema,  Neurological: A/O x 3, no focal deficits, no tremors. brisk DTRs  Psychiatric: Normal mood, normal affect  Skin: No rashes        LABS:                        10.9   5.0   )-----------( 313      ( 09 Mar 2017 05:37 )             33.2     09 Mar 2017 05:37    139    |  103    |  12.0   ----------------------------<  106    3.9     |  23.0   |  0.23     Ca    9.4        09 Mar 2017 05:37    TPro  6.5    /  Alb  3.1    /  TBili  0.4    /  DBili  0.1    /  AST  42     /  ALT  61     /  AlkPhos  82     09 Mar 2017 05:37 [phone]

## 2019-11-07 ENCOUNTER — RESULT REVIEW (OUTPATIENT)
Age: 39
End: 2019-11-07

## 2019-12-06 ENCOUNTER — RESULT REVIEW (OUTPATIENT)
Age: 39
End: 2019-12-06

## 2022-03-19 ENCOUNTER — EMERGENCY (EMERGENCY)
Facility: HOSPITAL | Age: 42
LOS: 1 days | Discharge: HOME | End: 2022-03-19
Attending: EMERGENCY MEDICINE
Payer: COMMERCIAL

## 2022-03-19 VITALS
OXYGEN SATURATION: 97 % | HEART RATE: 72 BPM | RESPIRATION RATE: 17 BRPM | DIASTOLIC BLOOD PRESSURE: 77 MMHG | SYSTOLIC BLOOD PRESSURE: 152 MMHG

## 2022-03-19 VITALS
TEMPERATURE: 98 F | SYSTOLIC BLOOD PRESSURE: 145 MMHG | OXYGEN SATURATION: 99 % | HEART RATE: 75 BPM | HEIGHT: 62 IN | WEIGHT: 250 LBS | DIASTOLIC BLOOD PRESSURE: 86 MMHG | RESPIRATION RATE: 18 BRPM

## 2022-03-19 PROCEDURE — 93971 EXTREMITY STUDY: CPT

## 2022-03-19 PROCEDURE — 99284 EMERGENCY DEPT VISIT MOD MDM: CPT | Mod: 25

## 2022-03-19 PROCEDURE — 99284 EMERGENCY DEPT VISIT MOD MDM: CPT

## 2022-03-19 PROCEDURE — 93971 EXTREMITY STUDY: CPT | Mod: 26,LT

## 2022-03-19 RX ORDER — DEXAMETHASONE 0.5 MG/5ML
10 ELIXIR ORAL ONCE
Refills: 0 | Status: COMPLETED | OUTPATIENT
Start: 2022-03-19 | End: 2022-03-19

## 2022-03-19 RX ADMIN — Medication 10 MILLIGRAM(S): at 18:01
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
## 2022-03-19 NOTE — ED PROVIDER NOTE - PHYSICAL EXAMINATION
Gen: non toxic appearing, NAD   Head: NC/NT  Eyes: anicteric  ENT: airway patent, mmm  CV: RRR, +S1/S2   Resp: no respiratory distress  GI:  abdomen soft non-distended, NTTP   Back: no spinal tto  Extremities - no  ttp of knee, able to range the knees b/l, no swelling, no warmth to palpation  Neuro: A&Ox4,  5/5 strength, sensation intact of lower extremities

## 2022-03-19 NOTE — ED PROVIDER NOTE - PATIENT PORTAL LINK FT
You can access the FollowMyHealth Patient Portal offered by [facility] by registering at the following website: [email or web address]. By joining [employer]’s FollowMyHealth portal, you will also be able to view your health information using other applications (apps) compatible with our system.

## 2022-03-19 NOTE — ED PROVIDER NOTE - NSFOLLOWUPCLINICS_GEN_ALL_ED_FT
[facility] Sports Medicine  Sports Medicine  [address] [place] [address]  Phone: [phone]  Fax:
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
## 2022-03-19 NOTE — ED PROVIDER NOTE - CLINICAL SUMMARY MEDICAL DECISION MAKING FREE TEXT BOX
Eval for dvt. Decadron. Concern for arthritic pain. If workup is negative - Fu with sports medicine. Eval for dvt. Decadron. Concern for arthritic pain. If workup is negative - Fu with sports medicine.    Dr. Connor Note: popliteal pain s/p injury 7mo ago, r/o DVT.  Knee pain likely arthritic given morning stiffness that is improved with walking....outpt ortho, start small dose steroids.
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
## 2022-03-19 NOTE — ED PROVIDER NOTE - NSFOLLOWUPINSTRUCTIONS_ED_ALL_ED_FT
You were seen for knee pain. Please follow up with sports medicine for continue care.     For pain or fever you can ibuprofen (motrin, advil) or tylenol as needed, as directed on packaging.  You can use 500-1000mg Tylenol every 6 hours for pain - as needed.  This is an over-the-counter medications - please respect the warnings on the label. This medication come with certain risks and side effects that you need to discuss with your doctor, especially if you are taking it for a prolonged period.    You can use 400-600mg Ibuprofen (such as motrin or advil) every 6 to 8 hours as needed for pain control.  Take ibuprofen with food or milk to lessen stomach upset.  This is an over-the-counter medication please respect the warnings on the label. All medications come with certain risks and side effects that you need to discuss with your doctor, especially if you are taking them for a prolonged period.    No signs of emergency medical condition on today's workup.  Your results are attached with your discharge instructions, please review them with your primary care physician. If there is a result pending, you will receive a call if test is positive.    A presumptive diagnosis is made today, but further evaluation may be required by your primary care doctor and/or specialist for a definitive diagnosis. Therefore, follow up as directed and if symptoms change/worsen or any emergency conditions, please return to the ER.    If needed, call patient access services at [phone] to find a primary care doctor, or call at [phone] to make an appointment at the clinic.

## 2022-03-19 NOTE — ED PROVIDER NOTE - CARE PLAN
1 Principal Discharge DX:	Knee pain   Principal Discharge DX:	Left knee pain  Secondary Diagnosis:	Leg pain, posterior, left

## 2023-03-11 ENCOUNTER — NON-APPOINTMENT (OUTPATIENT)
Age: 43
End: 2023-03-11

## 2023-03-23 NOTE — H&P ADULT - CARDIOVASCULAR
Quality 431: Preventive Care And Screening: Unhealthy Alcohol Use - Screening: Patient not identified as an unhealthy alcohol user when screened for unhealthy alcohol use using a systematic screening method negative detailed exam

## 2023-04-11 ENCOUNTER — APPOINTMENT (OUTPATIENT)
Dept: OBGYN | Facility: CLINIC | Age: 43
End: 2023-04-11
Payer: COMMERCIAL

## 2023-04-11 VITALS
DIASTOLIC BLOOD PRESSURE: 80 MMHG | BODY MASS INDEX: 46.26 KG/M2 | WEIGHT: 245 LBS | SYSTOLIC BLOOD PRESSURE: 132 MMHG | HEIGHT: 61 IN

## 2023-04-11 DIAGNOSIS — Z86.39 PERSONAL HISTORY OF OTHER ENDOCRINE, NUTRITIONAL AND METABOLIC DISEASE: ICD-10-CM

## 2023-04-11 DIAGNOSIS — N91.2 AMENORRHEA, UNSPECIFIED: ICD-10-CM

## 2023-04-11 DIAGNOSIS — Z78.9 OTHER SPECIFIED HEALTH STATUS: ICD-10-CM

## 2023-04-11 DIAGNOSIS — R21 RASH AND OTHER NONSPECIFIC SKIN ERUPTION: ICD-10-CM

## 2023-04-11 DIAGNOSIS — M54.9 DORSALGIA, UNSPECIFIED: ICD-10-CM

## 2023-04-11 DIAGNOSIS — I10 ESSENTIAL (PRIMARY) HYPERTENSION: ICD-10-CM

## 2023-04-11 DIAGNOSIS — R31.9 HEMATURIA, UNSPECIFIED: ICD-10-CM

## 2023-04-11 DIAGNOSIS — E05.90 THYROTOXICOSIS, UNSPECIFIED W/OUT THYROTOXIC CRISIS OR STORM: ICD-10-CM

## 2023-04-11 DIAGNOSIS — R00.2 PALPITATIONS: ICD-10-CM

## 2023-04-11 PROCEDURE — 99386 PREV VISIT NEW AGE 40-64: CPT

## 2023-04-11 RX ORDER — TRIAMCINOLONE ACETONIDE 1 MG/G
0.1 CREAM TOPICAL TWICE DAILY
Qty: 1 | Refills: 1 | Status: ACTIVE | COMMUNITY
Start: 2023-04-11 | End: 1900-01-01

## 2023-04-11 RX ORDER — CYCLOBENZAPRINE HYDROCHLORIDE 10 MG/1
10 TABLET, FILM COATED ORAL
Refills: 0 | Status: ACTIVE | COMMUNITY

## 2023-04-11 RX ORDER — ATENOLOL 50 MG/1
50 TABLET ORAL
Refills: 0 | Status: ACTIVE | COMMUNITY

## 2023-04-11 NOTE — PLAN
[FreeTextEntry1] : well woman\par \par amenorrhea x 2 years\par \par ? POF vs hormone issue\par \par breifly discussed  hormone therapy if POF\par wt loss....\par \par f/u pap and mammo\par \par discussed colon cancer screening

## 2023-04-12 LAB
ALBUMIN SERPL ELPH-MCNC: 4.2 G/DL
ALP BLD-CCNC: 113 U/L
ALT SERPL-CCNC: 13 U/L
ANION GAP SERPL CALC-SCNC: 13 MMOL/L
AST SERPL-CCNC: 18 U/L
BILIRUB SERPL-MCNC: 0.3 MG/DL
BUN SERPL-MCNC: 11 MG/DL
CALCIUM SERPL-MCNC: 9.9 MG/DL
CHLORIDE SERPL-SCNC: 101 MMOL/L
CO2 SERPL-SCNC: 24 MMOL/L
CREAT SERPL-MCNC: 0.56 MG/DL
EGFR: 117 ML/MIN/1.73M2
ESTIMATED AVERAGE GLUCOSE: 154 MG/DL
ESTRADIOL SERPL-MCNC: 51 PG/ML
FSH SERPL-MCNC: 7.8 IU/L
GLUCOSE SERPL-MCNC: 108 MG/DL
HBA1C MFR BLD HPLC: 7 %
HCT VFR BLD CALC: 40.5 %
HGB BLD-MCNC: 12.8 G/DL
MCHC RBC-ENTMCNC: 26.6 PG
MCHC RBC-ENTMCNC: 31.6 GM/DL
MCV RBC AUTO: 84.2 FL
PLATELET # BLD AUTO: 341 K/UL
POTASSIUM SERPL-SCNC: 4.2 MMOL/L
PROLACTIN SERPL-MCNC: 12.1 NG/ML
PROT SERPL-MCNC: 7.8 G/DL
RBC # BLD: 4.81 M/UL
RBC # FLD: 13.7 %
SODIUM SERPL-SCNC: 138 MMOL/L
T3FREE SERPL-MCNC: 2.98 PG/ML
T4 FREE SERPL-MCNC: 1.1 NG/DL
THYROPEROXIDASE AB SERPL IA-ACNC: <10 IU/ML
TSH SERPL-ACNC: 1.52 UIU/ML
WBC # FLD AUTO: 8.71 K/UL

## 2023-04-14 LAB — HPV HIGH+LOW RISK DNA PNL CVX: NOT DETECTED

## 2023-04-17 LAB — CYTOLOGY CVX/VAG DOC THIN PREP: NORMAL

## 2023-04-27 ENCOUNTER — APPOINTMENT (OUTPATIENT)
Dept: ULTRASOUND IMAGING | Facility: CLINIC | Age: 43
End: 2023-04-27
Payer: COMMERCIAL

## 2023-04-27 ENCOUNTER — RESULT REVIEW (OUTPATIENT)
Age: 43
End: 2023-04-27

## 2023-04-27 ENCOUNTER — APPOINTMENT (OUTPATIENT)
Dept: MAMMOGRAPHY | Facility: CLINIC | Age: 43
End: 2023-04-27
Payer: COMMERCIAL

## 2023-04-27 PROCEDURE — 77067 SCR MAMMO BI INCL CAD: CPT

## 2023-04-27 PROCEDURE — 76830 TRANSVAGINAL US NON-OB: CPT

## 2023-04-27 PROCEDURE — 77063 BREAST TOMOSYNTHESIS BI: CPT

## 2023-05-12 ENCOUNTER — APPOINTMENT (OUTPATIENT)
Dept: OBGYN | Facility: CLINIC | Age: 43
End: 2023-05-12
Payer: COMMERCIAL

## 2023-05-12 PROCEDURE — 99213 OFFICE O/P EST LOW 20 MIN: CPT | Mod: 95

## 2023-05-15 RX ORDER — DOXYCYCLINE HYCLATE 100 MG/1
100 TABLET ORAL
Qty: 28 | Refills: 0 | Status: ACTIVE | COMMUNITY
Start: 2023-05-15 | End: 1900-01-01

## 2023-05-15 RX ORDER — METRONIDAZOLE 500 MG/1
500 TABLET ORAL TWICE DAILY
Qty: 14 | Refills: 0 | Status: ACTIVE | COMMUNITY
Start: 2023-05-15 | End: 1900-01-01

## 2023-05-15 RX ADMIN — CEFOXITIN SODIUM GM: 1 POWDER, FOR SOLUTION INTRAVENOUS at 00:00

## 2023-05-16 ENCOUNTER — NON-APPOINTMENT (OUTPATIENT)
Age: 43
End: 2023-05-16

## 2023-05-22 LAB — TSI ACT/NOR SER: 0.83 IU/L

## 2023-06-01 ENCOUNTER — NON-APPOINTMENT (OUTPATIENT)
Age: 43
End: 2023-06-01

## 2023-06-02 ENCOUNTER — APPOINTMENT (OUTPATIENT)
Dept: OBGYN | Facility: CLINIC | Age: 43
End: 2023-06-02
Payer: COMMERCIAL

## 2023-06-02 DIAGNOSIS — N73.0 ACUTE PARAMETRITIS AND PELVIC CELLULITIS: ICD-10-CM

## 2023-06-02 PROCEDURE — 96372 THER/PROPH/DIAG INJ SC/IM: CPT

## 2023-06-02 PROCEDURE — 99213 OFFICE O/P EST LOW 20 MIN: CPT | Mod: 25

## 2023-06-02 RX ORDER — CEFTRIAXONE 500 MG/1
500 INJECTION, POWDER, FOR SOLUTION INTRAMUSCULAR; INTRAVENOUS
Qty: 0 | Refills: 0 | Status: COMPLETED | OUTPATIENT
Start: 2023-06-02

## 2023-06-02 RX ADMIN — CEFTRIAXONE 1 MG: 500 INJECTION, POWDER, FOR SOLUTION INTRAMUSCULAR; INTRAVENOUS at 00:00

## 2023-06-02 NOTE — HISTORY OF PRESENT ILLNESS
[FreeTextEntry1] : Patient is a 43 year old,  presenting for follow up treatment for PID.\par Patient reports that she was instructed by Dr. Garcia to RTO for injection s/p antibiotic treatment.\par Patient completed course of antibiotics yesterday (had one missed dose).\par Denies any symptoms at present.\par \par

## 2023-06-02 NOTE — PLAN
[FreeTextEntry1] : - Patient tolerated injection without issues.\par - Patient to schedule pelvic MRI as discussed with provider.\par - Patient will follow up as recommended by primary GYN\par - All other questions and concerns addressed at this time.\par

## 2023-06-13 NOTE — ED ADULT NURSE NOTE - PAIN: PRESENCE, MLM
[de-identified] : healthy eating,exercise [None] : None [Good understanding] : Patient has a good understanding of lifestyle changes and steps needed to achieve self management goal complains of pain/discomfort

## 2023-06-22 ENCOUNTER — EMERGENCY (EMERGENCY)
Facility: HOSPITAL | Age: 43
LOS: 1 days | Discharge: HOME | End: 2023-06-22
Attending: EMERGENCY MEDICINE
Payer: COMMERCIAL

## 2023-06-22 VITALS
RESPIRATION RATE: 18 BRPM | SYSTOLIC BLOOD PRESSURE: 138 MMHG | DIASTOLIC BLOOD PRESSURE: 81 MMHG | OXYGEN SATURATION: 99 % | HEART RATE: 82 BPM | TEMPERATURE: 98 F

## 2023-06-22 VITALS
OXYGEN SATURATION: 99 % | HEART RATE: 86 BPM | HEIGHT: 62 IN | RESPIRATION RATE: 18 BRPM | DIASTOLIC BLOOD PRESSURE: 91 MMHG | SYSTOLIC BLOOD PRESSURE: 156 MMHG | TEMPERATURE: 98 F | WEIGHT: 235.01 LBS

## 2023-06-22 LAB
ALBUMIN SERPL ELPH-MCNC: 4.2 G/DL — SIGNIFICANT CHANGE UP (ref 3.3–5)
ALP SERPL-CCNC: 96 U/L — SIGNIFICANT CHANGE UP (ref 40–120)
ALT FLD-CCNC: 17 U/L — SIGNIFICANT CHANGE UP (ref 10–45)
ANION GAP SERPL CALC-SCNC: 12 MMOL/L — SIGNIFICANT CHANGE UP (ref 5–17)
APPEARANCE UR: CLEAR — SIGNIFICANT CHANGE UP
AST SERPL-CCNC: 23 U/L — SIGNIFICANT CHANGE UP (ref 10–40)
BACTERIA # UR AUTO: NEGATIVE — SIGNIFICANT CHANGE UP
BASOPHILS # BLD AUTO: 0.03 K/UL — SIGNIFICANT CHANGE UP (ref 0–0.2)
BASOPHILS NFR BLD AUTO: 0.4 % — SIGNIFICANT CHANGE UP (ref 0–2)
BILIRUB SERPL-MCNC: 0.2 MG/DL — SIGNIFICANT CHANGE UP (ref 0.2–1.2)
BILIRUB UR-MCNC: NEGATIVE — SIGNIFICANT CHANGE UP
BUN SERPL-MCNC: 6 MG/DL — LOW (ref 7–23)
CALCIUM SERPL-MCNC: 9.7 MG/DL — SIGNIFICANT CHANGE UP (ref 8.4–10.5)
CHLORIDE SERPL-SCNC: 106 MMOL/L — SIGNIFICANT CHANGE UP (ref 96–108)
CO2 SERPL-SCNC: 22 MMOL/L — SIGNIFICANT CHANGE UP (ref 22–31)
COLOR SPEC: COLORLESS — SIGNIFICANT CHANGE UP
CREAT SERPL-MCNC: 0.6 MG/DL — SIGNIFICANT CHANGE UP (ref 0.5–1.3)
DIFF PNL FLD: ABNORMAL
EGFR: 115 ML/MIN/1.73M2 — SIGNIFICANT CHANGE UP
EOSINOPHIL # BLD AUTO: 0.15 K/UL — SIGNIFICANT CHANGE UP (ref 0–0.5)
EOSINOPHIL NFR BLD AUTO: 2 % — SIGNIFICANT CHANGE UP (ref 0–6)
EPI CELLS # UR: 2 /HPF — SIGNIFICANT CHANGE UP
GLUCOSE SERPL-MCNC: 131 MG/DL — HIGH (ref 70–99)
GLUCOSE UR QL: NEGATIVE — SIGNIFICANT CHANGE UP
HCG SERPL-ACNC: <2 MIU/ML — SIGNIFICANT CHANGE UP
HCT VFR BLD CALC: 40.5 % — SIGNIFICANT CHANGE UP (ref 34.5–45)
HGB BLD-MCNC: 13.2 G/DL — SIGNIFICANT CHANGE UP (ref 11.5–15.5)
HYALINE CASTS # UR AUTO: 1 /LPF — SIGNIFICANT CHANGE UP (ref 0–2)
IMM GRANULOCYTES NFR BLD AUTO: 0.3 % — SIGNIFICANT CHANGE UP (ref 0–0.9)
KETONES UR-MCNC: NEGATIVE — SIGNIFICANT CHANGE UP
LEUKOCYTE ESTERASE UR-ACNC: NEGATIVE — SIGNIFICANT CHANGE UP
LYMPHOCYTES # BLD AUTO: 2.46 K/UL — SIGNIFICANT CHANGE UP (ref 1–3.3)
LYMPHOCYTES # BLD AUTO: 32 % — SIGNIFICANT CHANGE UP (ref 13–44)
MCHC RBC-ENTMCNC: 26.8 PG — LOW (ref 27–34)
MCHC RBC-ENTMCNC: 32.6 GM/DL — SIGNIFICANT CHANGE UP (ref 32–36)
MCV RBC AUTO: 82.2 FL — SIGNIFICANT CHANGE UP (ref 80–100)
MONOCYTES # BLD AUTO: 0.73 K/UL — SIGNIFICANT CHANGE UP (ref 0–0.9)
MONOCYTES NFR BLD AUTO: 9.5 % — SIGNIFICANT CHANGE UP (ref 2–14)
NEUTROPHILS # BLD AUTO: 4.29 K/UL — SIGNIFICANT CHANGE UP (ref 1.8–7.4)
NEUTROPHILS NFR BLD AUTO: 55.8 % — SIGNIFICANT CHANGE UP (ref 43–77)
NITRITE UR-MCNC: NEGATIVE — SIGNIFICANT CHANGE UP
NRBC # BLD: 0 /100 WBCS — SIGNIFICANT CHANGE UP (ref 0–0)
PH UR: 7.5 — SIGNIFICANT CHANGE UP (ref 5–8)
PLATELET # BLD AUTO: 369 K/UL — SIGNIFICANT CHANGE UP (ref 150–400)
POTASSIUM SERPL-MCNC: 4.1 MMOL/L — SIGNIFICANT CHANGE UP (ref 3.5–5.3)
POTASSIUM SERPL-SCNC: 4.1 MMOL/L — SIGNIFICANT CHANGE UP (ref 3.5–5.3)
PROT SERPL-MCNC: 7.7 G/DL — SIGNIFICANT CHANGE UP (ref 6–8.3)
PROT UR-MCNC: NEGATIVE — SIGNIFICANT CHANGE UP
RBC # BLD: 4.93 M/UL — SIGNIFICANT CHANGE UP (ref 3.8–5.2)
RBC # FLD: 13.3 % — SIGNIFICANT CHANGE UP (ref 10.3–14.5)
RBC CASTS # UR COMP ASSIST: 97 /HPF — HIGH (ref 0–4)
SODIUM SERPL-SCNC: 140 MMOL/L — SIGNIFICANT CHANGE UP (ref 135–145)
SP GR SPEC: 1.01 — LOW (ref 1.01–1.02)
UROBILINOGEN FLD QL: NEGATIVE — SIGNIFICANT CHANGE UP
WBC # BLD: 7.68 K/UL — SIGNIFICANT CHANGE UP (ref 3.8–10.5)
WBC # FLD AUTO: 7.68 K/UL — SIGNIFICANT CHANGE UP (ref 3.8–10.5)
WBC UR QL: 1 /HPF — SIGNIFICANT CHANGE UP (ref 0–5)

## 2023-06-22 PROCEDURE — 99284 EMERGENCY DEPT VISIT MOD MDM: CPT | Mod: 25

## 2023-06-22 PROCEDURE — 93975 VASCULAR STUDY: CPT

## 2023-06-22 PROCEDURE — 80053 COMPREHEN METABOLIC PANEL: CPT

## 2023-06-22 PROCEDURE — 81001 URINALYSIS AUTO W/SCOPE: CPT

## 2023-06-22 PROCEDURE — 76830 TRANSVAGINAL US NON-OB: CPT

## 2023-06-22 PROCEDURE — 84702 CHORIONIC GONADOTROPIN TEST: CPT

## 2023-06-22 PROCEDURE — 93975 VASCULAR STUDY: CPT | Mod: 26

## 2023-06-22 PROCEDURE — 76830 TRANSVAGINAL US NON-OB: CPT | Mod: 26

## 2023-06-22 PROCEDURE — 99284 EMERGENCY DEPT VISIT MOD MDM: CPT

## 2023-06-22 PROCEDURE — 85025 COMPLETE CBC W/AUTO DIFF WBC: CPT

## 2023-06-22 RX ORDER — LIDOCAINE 4 G/100G
1 CREAM TOPICAL ONCE
Refills: 0 | Status: COMPLETED | OUTPATIENT
Start: 2023-06-22 | End: 2023-06-22

## 2023-06-22 RX ORDER — ACETAMINOPHEN 500 MG
1000 TABLET ORAL ONCE
Refills: 0 | Status: COMPLETED | OUTPATIENT
Start: 2023-06-22 | End: 2023-06-22

## 2023-06-22 RX ADMIN — Medication 400 MILLIGRAM(S): at 16:20

## 2023-06-22 RX ADMIN — LIDOCAINE 1 PATCH: 4 CREAM TOPICAL at 16:20

## 2023-06-22 NOTE — ED PROVIDER NOTE - CLINICAL SUMMARY MEDICAL DECISION MAKING FREE TEXT BOX
DDx:  less likely kidney stone given chronicity of it, less likely Greg.  Likely is musculoskeletal.   No red flags of back pain to suggest other sinister etiology.  Given history of PID and hydrosalpinx, patient without fevers no chills, no adnexal tenderness,   Will rule out increasing hydrosalpinx or TOA or growing cyst with transvaginal ultrasound.  Will test urine for UTI   Labs:  CBC CMP UA UC  Imaging:   Transvaginal ultrasound   Tx: pain meds at this time  Consults/Resources: likely none   Dispo: likely home pending     Triage note reviewed. VS reviewed. EKG reviewed and documented in "RESULTS" section, if possible at given time.     DDx in MDM includes the most likely ddx, but is not limited to solely what is listed. Progress notes written as needed, and are included in "PROGRESS NOTE" section below.       Medical, family, and social determinants of health reviewed and discussed w/ pt/family/caretaker, when allowable, and is incorporated into note above, whenever possible.

## 2023-06-22 NOTE — ED PROVIDER NOTE - PROGRESS NOTE DETAILS
jaden SOLIS PGY-1: Negative ultrasound for acute pathology to explain her pain.  Urine negative.  Blood work not showing infection.  we will discuss the utility of a CT with patient,  likely will not assist in her pathology.  No red flag symptoms though.  Will discharge with spine and sports med follow-up

## 2023-06-22 NOTE — ED PROVIDER NOTE - OBJECTIVE STATEMENT
HPI & ROS: 43-year-old female history of  hydrosalpinx, PID recently treated  with treatment of antibiotics fully, patient with chronic back pain, presenting with acute on chronic back pain.  Back pain started 6 months ago.  Atraumatic.  In her midline lumbar  also with her right flank as well.  States the pain is 2 out of 10, movement makes it worse.  When the pain is at its max, is a 5 out of 10 causing the patient to cry.  Patient does not like pain medicines, has not taken anything for it.  No recent fevers chills, no IV drug use, vital signs here showing no fever, not meeting SIRS criteria.   Patient without saddle anesthesia.  Patient without bowel or bladder incontinence.

## 2023-06-22 NOTE — ED PROVIDER NOTE - NSFOLLOWUPINSTRUCTIONS_ED_ALL_ED_FT
-  came to the emergency room because of this back/flank pain.  Your urine was negative.  Your imaging showed nothing different from the last imaging you had done.  We are treating this is back pain.  Please take Tylenol and ibuprofen.  Reasons to come back include but are not limited to in the area that you wipe after a bowel movement or urinating, if that ever becomes completely numb that is a lack of sensation completely.  Or your legs become numb as well.    - Your testing/exams was/were reassuring that dangerous emergencies/conditions are less likely to be occurring or to have occurred.    - Take all medications, if given/sent to pharmacy, and as, directed.    - If you had labs or imaging done, you were given copies of all labs and/or imaging results from your er visit--please take them with you to your follow up appointments.  - If needed, call patient access services at [phone] to find a primary care physician (PCP). Call this number to follow up with a specialty service, such as the spine clinic. If you need this, call and say you were recently in the emergency department and you are calling, per my orders.   - Make sure you do not require a primary care physician's referral if you make a specialty clinic appointment directly. Some insurance requires you to see your PCP, get a referral, then make a specialty appointment.

## 2023-06-22 NOTE — ED ADULT TRIAGE NOTE - CHIEF COMPLAINT QUOTE
pt c/o R lower back pain radiating down R buttock and R thigh/groin x 6 months  pt denies numbness/tingling, urinary symptoms

## 2023-06-22 NOTE — ED ADULT NURSE NOTE - OBJECTIVE STATEMENT
44 yo F pt p/w R flank and R buttocks pain xs 6 months intermittent. pt has tried nothing for pain relief and denies any trauma.  pt is A&Ox4, MAEW, no CVA tenderness, abd soft non tender, skin warm dry intact/normal for race, no ecchymosis, no spinal tenderness/step off.  Pt denies headache, dizziness, chest pain, palpitations, cough, SOB, abdominal pain, n/v/d, urinary symptoms, fevers, chills, weakness at this time.

## 2023-06-22 NOTE — ED PROVIDER NOTE - ATTENDING CONTRIBUTION TO CARE
Attending MD Villalpando: I personally have seen and examined this patient.  Resident note reviewed and agree on plan of care and except where noted.  See below for details.     Seen in [facility]    43F with PMH/PSH including chronic back pain, recent tx for PID/hydrosalpinx presents to the ED with  back pain.  Reports back pain started 6 months ago, denies inciting event, trauma, MVC.  Reports pain worse with movement, denies taking regular analgesics for pain.  Denies dysuria, hematuria, change in urinary habits including frequency, urgency. Denies numbness, weakness or tingling in extremities. Denies loss of urinary or bowel continence. Denies vaginal discharge, genital lesions.  Denies chest pain, shortness of breath, abdominal pain, nausea, vomiting, diarrhea, urinary complaints.     Exam:   General: NAD  HENT: head NCAT, airway patent  Eyes: anicteric, no conjunctival injection   Lungs: lungs CTAB with good inspiratory effort, no wheezing, no rhonchi, no rales  Cardiac: +S1S2, no obvious m/r/g  GI: abdomen soft with +BS, NT, ND  : equivocal CVAT  MSK: ranging neck and extremities freely, no midline spine tenderness, neg SLR, +tenderness over entire lumbar area  Neuro: moving all extremities spontaneously with 5/5 strength, nonfocal, no saddle anesthesia  Psych: normal mood and affect     A/P: 43F with back pain, will obtain labs and US pelvis given recent hydrosalpinx/PID, will consider Gyn as needed, will also consider uro etiology, will obtain UA/UrCx, possible acute on chronic back pain, will give analgesia, reassess

## 2023-06-22 NOTE — ED PROVIDER NOTE - PATIENT PORTAL LINK FT
You can access the FollowMyHealth Patient Portal offered by [facility] by registering at the following website: [email or web address]. By joining [facility]’s FollowMyHealth portal, you will also be able to view your health information using other applications (apps) compatible with our system.

## 2023-07-06 ENCOUNTER — OUTPATIENT (OUTPATIENT)
Dept: OUTPATIENT SERVICES | Facility: HOSPITAL | Age: 43
LOS: 1 days | End: 2023-07-06
Payer: COMMERCIAL

## 2023-07-06 ENCOUNTER — APPOINTMENT (OUTPATIENT)
Dept: MRI IMAGING | Facility: CLINIC | Age: 43
End: 2023-07-06
Payer: COMMERCIAL

## 2023-07-06 DIAGNOSIS — Z00.8 ENCOUNTER FOR OTHER GENERAL EXAMINATION: ICD-10-CM

## 2023-07-06 PROCEDURE — A9585: CPT

## 2023-07-06 PROCEDURE — 72197 MRI PELVIS W/O & W/DYE: CPT

## 2023-07-06 PROCEDURE — 72197 MRI PELVIS W/O & W/DYE: CPT | Mod: 26

## 2023-07-10 ENCOUNTER — APPOINTMENT (OUTPATIENT)
Dept: UROLOGY | Facility: CLINIC | Age: 43
End: 2023-07-10

## 2023-07-12 ENCOUNTER — APPOINTMENT (OUTPATIENT)
Dept: UROLOGY | Facility: CLINIC | Age: 43
End: 2023-07-12
Payer: COMMERCIAL

## 2023-07-12 PROCEDURE — 99204 OFFICE O/P NEW MOD 45 MIN: CPT

## 2023-07-12 NOTE — PHYSICAL EXAM
[Normal Appearance] : normal appearance [Edema] : no peripheral edema [Exaggerated Use Of Accessory Muscles For Inspiration] : no accessory muscle use [Abdomen Tenderness] : non-tender [Urinary Bladder Findings] : the bladder was normal on palpation [Normal Station and Gait] : the gait and station were normal for the patient's age [] : no rash [No Focal Deficits] : no focal deficits [Not Anxious] : not anxious [No Palpable Adenopathy] : no palpable adenopathy

## 2023-07-12 NOTE — ASSESSMENT
[FreeTextEntry1] : 43-year-old female with microscopic hematuria.  She was found to have 92 RBCs/HPF on a urinalysis.  She denies all urinary symptoms.  Given the number of RBCs she is high risk and will undergo CT urogram with cystoscopy\par \par Plan\par - Urinalysis\par - Urine culture\par - CT urogram\par - Cystoscopy in 4 weeks

## 2023-07-12 NOTE — HISTORY OF PRESENT ILLNESS
[FreeTextEntry1] : Patient is a 44 yo female with microscopic hematuria. She denies any family history of gu maliginancy. Denies gross hematuria. Denies all urianry symptoms.

## 2023-07-13 LAB
APPEARANCE: CLEAR
BACTERIA: ABNORMAL /HPF
BILIRUBIN URINE: NEGATIVE
BLOOD URINE: ABNORMAL
CAST: 0 /LPF
COLOR: YELLOW
EPITHELIAL CELLS: 3 /HPF
GLUCOSE QUALITATIVE U: NEGATIVE MG/DL
KETONES URINE: NEGATIVE MG/DL
LEUKOCYTE ESTERASE URINE: NEGATIVE
MICROSCOPIC-UA: NORMAL
NITRITE URINE: NEGATIVE
PH URINE: 6
PROTEIN URINE: NEGATIVE MG/DL
RED BLOOD CELLS URINE: 1 /HPF
REVIEW: NORMAL
SPECIFIC GRAVITY URINE: 1.01
UROBILINOGEN URINE: 0.2 MG/DL
WHITE BLOOD CELLS URINE: 2 /HPF

## 2023-07-17 LAB — BACTERIA UR CULT: NORMAL

## 2023-07-18 ENCOUNTER — NON-APPOINTMENT (OUTPATIENT)
Age: 43
End: 2023-07-18

## 2023-07-20 ENCOUNTER — APPOINTMENT (OUTPATIENT)
Dept: CT IMAGING | Facility: CLINIC | Age: 43
End: 2023-07-20
Payer: COMMERCIAL

## 2023-07-20 ENCOUNTER — OUTPATIENT (OUTPATIENT)
Dept: OUTPATIENT SERVICES | Facility: HOSPITAL | Age: 43
LOS: 1 days | End: 2023-07-20
Payer: COMMERCIAL

## 2023-07-20 DIAGNOSIS — R31.29 OTHER MICROSCOPIC HEMATURIA: ICD-10-CM

## 2023-07-20 PROCEDURE — 74178 CT ABD&PLV WO CNTR FLWD CNTR: CPT | Mod: 26

## 2023-07-20 PROCEDURE — 74178 CT ABD&PLV WO CNTR FLWD CNTR: CPT

## 2023-08-10 ENCOUNTER — APPOINTMENT (OUTPATIENT)
Dept: UROLOGY | Facility: CLINIC | Age: 43
End: 2023-08-10
Payer: COMMERCIAL

## 2023-08-10 VITALS
HEIGHT: 61 IN | TEMPERATURE: 98 F | WEIGHT: 245 LBS | DIASTOLIC BLOOD PRESSURE: 101 MMHG | HEART RATE: 83 BPM | BODY MASS INDEX: 46.26 KG/M2 | OXYGEN SATURATION: 98 % | SYSTOLIC BLOOD PRESSURE: 148 MMHG

## 2023-08-10 DIAGNOSIS — R31.29 OTHER MICROSCOPIC HEMATURIA: ICD-10-CM

## 2023-08-10 PROCEDURE — 99213 OFFICE O/P EST LOW 20 MIN: CPT

## 2023-08-10 NOTE — HISTORY OF PRESENT ILLNESS
[FreeTextEntry1] : 43-year-old female with microscopic hematuria.  She has no risk factors like family history or smoking.  She is here to discuss results of her CT urogram.

## 2023-08-10 NOTE — ASSESSMENT
[FreeTextEntry1] : 43-year-old female with microscopic hematuria.  Her repeat urinalysis showed 1 RBC per high-power field.  I discussed with her the results of her CT urogram which did not demonstrate any hydronephrosis, masses, stones.  I discussed with her that in order to complete the microscopic hematuria workup she would need a cystoscopy.  However, given her recent negative urinalysis she would like to defer this for now.  She will continue to undergo annual urinalysis and told if she has a repeat positive urinalysis for microscopic hematuria she will undergo a cystoscopy at that time.

## 2024-02-08 ENCOUNTER — APPOINTMENT (OUTPATIENT)
Dept: ORTHOPEDIC SURGERY | Facility: CLINIC | Age: 44
End: 2024-02-08
Payer: COMMERCIAL

## 2024-02-08 VITALS — WEIGHT: 244 LBS | HEIGHT: 62 IN | BODY MASS INDEX: 44.9 KG/M2

## 2024-02-08 DIAGNOSIS — Z63.5 DISRUPTION OF FAMILY BY SEPARATION AND DIVORCE: ICD-10-CM

## 2024-02-08 DIAGNOSIS — I10 ESSENTIAL (PRIMARY) HYPERTENSION: ICD-10-CM

## 2024-02-08 PROCEDURE — 73564 X-RAY EXAM KNEE 4 OR MORE: CPT | Mod: LT

## 2024-02-08 PROCEDURE — 99214 OFFICE O/P EST MOD 30 MIN: CPT

## 2024-02-08 RX ORDER — MELOXICAM 15 MG/1
15 TABLET ORAL
Qty: 30 | Refills: 0 | Status: ACTIVE | COMMUNITY
Start: 2024-02-08 | End: 1900-01-01

## 2024-02-08 SDOH — SOCIAL STABILITY - SOCIAL INSECURITY: DISRUPTION OF FAMILY BY SEPARATION AND DIVORCE: Z63.5

## 2024-02-08 NOTE — HISTORY OF PRESENT ILLNESS
[6] : 6 [8] : 8 [Burning] : burning [Intermittent] : intermittent [Work] : work [Sleep] : sleep [Lying in bed] : lying in bed [Full time] : Work status: full time [de-identified] : On and off left knee pain for several years that is worsening, [] : no [FreeTextEntry1] : Left knee [FreeTextEntry7] : sometimes to her foot/toes [de-identified] : getting up from a sitting position [de-identified] : [employer] [de-identified] : no recent tests

## 2024-02-08 NOTE — DISCUSSION/SUMMARY
[de-identified] : General Dx Discussion The patient was advised of the diagnosis. The natural history of the pathology was explained in full to the patient in layman's terms. All questions were answered. The risks and benefits of surgical and non-surgical treatment alternatives were explained in full to the patient.  will get auth for orthovisc lt knee  will try mobic  f/u auth  weight loss discussed

## 2024-02-08 NOTE — PHYSICAL EXAM
[Left] : left knee [NL (0)] : extension 0 degrees [5___] : hamstring 5[unfilled]/5 [Negative] : negative Guy's [] : no pain with varus stress [TWNoteComboBox7] : flexion 110 degrees

## 2024-02-15 ENCOUNTER — APPOINTMENT (OUTPATIENT)
Dept: OBGYN | Facility: CLINIC | Age: 44
End: 2024-02-15
Payer: COMMERCIAL

## 2024-02-15 ENCOUNTER — ASOB RESULT (OUTPATIENT)
Age: 44
End: 2024-02-15

## 2024-02-15 VITALS — DIASTOLIC BLOOD PRESSURE: 96 MMHG | SYSTOLIC BLOOD PRESSURE: 153 MMHG

## 2024-02-15 PROCEDURE — 76830 TRANSVAGINAL US NON-OB: CPT

## 2024-02-15 PROCEDURE — 76856 US EXAM PELVIC COMPLETE: CPT

## 2024-02-15 RX ORDER — HYALURONATE SODIUM 30 MG/2 ML
30 SYRINGE (ML) INTRAARTICULAR
Qty: 4 | Refills: 0 | Status: ACTIVE | COMMUNITY
Start: 2024-02-14 | End: 1900-01-01

## 2024-03-07 ENCOUNTER — APPOINTMENT (OUTPATIENT)
Dept: OBGYN | Facility: CLINIC | Age: 44
End: 2024-03-07
Payer: COMMERCIAL

## 2024-03-07 VITALS — BODY MASS INDEX: 44.63 KG/M2 | SYSTOLIC BLOOD PRESSURE: 138 MMHG | DIASTOLIC BLOOD PRESSURE: 74 MMHG | WEIGHT: 244 LBS

## 2024-03-07 DIAGNOSIS — Z01.419 ENCOUNTER FOR GYNECOLOGICAL EXAMINATION (GENERAL) (ROUTINE) W/OUT ABNORMAL FINDINGS: ICD-10-CM

## 2024-03-07 PROCEDURE — 99396 PREV VISIT EST AGE 40-64: CPT

## 2024-03-07 NOTE — HISTORY OF PRESENT ILLNESS
[FreeTextEntry1] : 45yo P0 LMP ~ 3 wks here for annual exam  and f/u hydrosalpinx f/u hydrosalpingx w/o resolution on sono nml CT pelvis  male infertility

## 2024-03-07 NOTE — COUNSELING
[Nutrition/ Exercise/ Weight Management] : nutrition, exercise, weight management [Body Image] : body image [Vitamins/Supplements] : vitamins/supplements [Contraception/ Emergency Contraception/ Safe Sexual Practices] : contraception, emergency contraception, safe sexual practices [Preconception Care/ Fertility options] : preconception care, fertility options [STD (testing, results, tx)] : STD (testing, results, tx) [FreeTextEntry2] : declined full std screening

## 2024-03-07 NOTE — PHYSICAL EXAM
[Chaperone Declined] : Patient declined chaperone [Appropriately responsive] : appropriately responsive [Alert] : alert [No Acute Distress] : no acute distress [No Lymphadenopathy] : no lymphadenopathy [Soft] : soft [Non-distended] : non-distended [Non-tender] : non-tender [No HSM] : No HSM [No Lesions] : no lesions [No Mass] : no mass [Oriented x3] : oriented x3 [Examination Of The Breasts] : a normal appearance [No Masses] : no breast masses were palpable [Labia Majora] : normal [Labia Minora] : normal [Normal] : normal [FreeTextEntry5] : no cmt [Uterine Adnexae] : normal

## 2024-03-07 NOTE — DISCUSSION/SUMMARY
[FreeTextEntry1] : well woman  mri for further eval complex hydrosalpinx consider surgical treatment  discussed AMA risk w current medical issues  from   f/u mammo and pap  RTO of telehealth for MRI

## 2024-03-08 LAB — HPV HIGH+LOW RISK DNA PNL CVX: NOT DETECTED

## 2024-03-11 LAB — CYTOLOGY CVX/VAG DOC THIN PREP: NORMAL

## 2024-04-04 ENCOUNTER — APPOINTMENT (OUTPATIENT)
Dept: ORTHOPEDIC SURGERY | Facility: CLINIC | Age: 44
End: 2024-04-04
Payer: COMMERCIAL

## 2024-04-04 VITALS — WEIGHT: 244 LBS | HEIGHT: 62 IN | BODY MASS INDEX: 44.9 KG/M2

## 2024-04-04 DIAGNOSIS — M17.12 UNILATERAL PRIMARY OSTEOARTHRITIS, LEFT KNEE: ICD-10-CM

## 2024-04-04 PROCEDURE — J3490M: CUSTOM

## 2024-04-04 PROCEDURE — 99213 OFFICE O/P EST LOW 20 MIN: CPT | Mod: 25

## 2024-04-04 PROCEDURE — 20611 DRAIN/INJ JOINT/BURSA W/US: CPT | Mod: LT

## 2024-04-04 NOTE — PROCEDURE
[Large Joint Injection] : Large joint injection [Left] : of the left [Knee] : knee [Pain] : pain [Inflammation] : inflammation [Alcohol] : alcohol [Betadine] : betadine [Ethyl Chloride sprayed topically] : ethyl chloride sprayed topically [Sterile technique used] : sterile technique used [___ cc    3mg] :  Betamethasone (Celestone) ~Vcc of 3mg [___ cc    1%] : Lidocaine ~Vcc of 1%  [___ cc    0.25%] : Bupivacaine (Marcaine) ~Vcc of 0.25%  [] : Patient tolerated procedure well [Call if redness, pain or fever occur] : call if redness, pain or fever occur [Apply ice for 15min out of every hour for the next 12-24 hours as tolerated] : apply ice for 15 minutes out of every hour for the next 12-24 hours as tolerated [Previous OTC use and PT nontherapeutic] : patient has tried OTC's including aspirin, Ibuprofen, Aleve, etc or prescription NSAIDS, and/or exercises at home and/or physical therapy without satisfactory response [Patient had decreased mobility in the joint] : patient had decreased mobility in the joint [Risks, benefits, alternatives discussed / Verbal consent obtained] : the risks benefits, and alternatives have been discussed, and verbal consent was obtained [Prior failure or difficult injection] : prior failure or difficult injection [Altered anatomic landmarks d/t systemic disease] : altered anatomic landmarks d/t systemic disease [All ultrasound images have been permanently captured and stored accordingly in our picture archiving and communication system] : All ultrasound images have been permanently captured and stored accordingly in our picture archiving and communication system

## 2024-04-04 NOTE — REASON FOR VISIT
[FreeTextEntry2] : Follow up-left knee still reports pain mobic helped some has not gotten orthovisc due to co-pay

## 2024-04-04 NOTE — HISTORY OF PRESENT ILLNESS
[6] : 6 [9] : 9 [Burning] : burning [] : yes [Constant] : constant [Work] : work [Sleep] : sleep [Full time] : Work status: full time [de-identified] : Patient is here with worsening left knee pain. [FreeTextEntry1] : Left knee [de-identified] : activity [FreeTextEntry7] : up her thigh [de-identified] : none

## 2024-04-04 NOTE — DISCUSSION/SUMMARY
[de-identified] : General Dx Discussion The patient was advised of the diagnosis. The natural history of the pathology was explained in full to the patient in layman's terms. All questions were answered. The risks and benefits of surgical and non-surgical treatment alternatives were explained in full to the patient.  will try PT and CSI  will see one of our total joint spec to discuss further orthovisc vs TKA

## 2024-04-05 ENCOUNTER — APPOINTMENT (OUTPATIENT)
Dept: MRI IMAGING | Facility: CLINIC | Age: 44
End: 2024-04-05
Payer: COMMERCIAL

## 2024-04-05 ENCOUNTER — OUTPATIENT (OUTPATIENT)
Dept: OUTPATIENT SERVICES | Facility: HOSPITAL | Age: 44
LOS: 1 days | End: 2024-04-05
Payer: COMMERCIAL

## 2024-04-05 DIAGNOSIS — N73.0 ACUTE PARAMETRITIS AND PELVIC CELLULITIS: ICD-10-CM

## 2024-04-05 DIAGNOSIS — R31.29 OTHER MICROSCOPIC HEMATURIA: ICD-10-CM

## 2024-04-05 DIAGNOSIS — N70.11 CHRONIC SALPINGITIS: ICD-10-CM

## 2024-04-05 PROCEDURE — A9585: CPT

## 2024-04-05 PROCEDURE — 72197 MRI PELVIS W/O & W/DYE: CPT

## 2024-04-05 PROCEDURE — 72197 MRI PELVIS W/O & W/DYE: CPT | Mod: 26

## 2024-04-24 ENCOUNTER — APPOINTMENT (OUTPATIENT)
Dept: OBGYN | Facility: CLINIC | Age: 44
End: 2024-04-24
Payer: COMMERCIAL

## 2024-04-24 DIAGNOSIS — N70.11 CHRONIC SALPINGITIS: ICD-10-CM

## 2024-04-24 PROCEDURE — 99213 OFFICE O/P EST LOW 20 MIN: CPT

## 2024-05-09 ENCOUNTER — APPOINTMENT (OUTPATIENT)
Dept: MAMMOGRAPHY | Facility: CLINIC | Age: 44
End: 2024-05-09

## 2024-05-23 ENCOUNTER — APPOINTMENT (OUTPATIENT)
Dept: ORTHOPEDIC SURGERY | Facility: CLINIC | Age: 44
End: 2024-05-23

## 2024-11-05 NOTE — PATIENT PROFILE ADULT. - NS PRO ABUSE SCREEN AFRAID ANYONE YN
How Severe Are Your Warts?: moderate
Is This A New Presentation, Or A Follow-Up?: Follow Up Karoline
Treatment Number (Optional): 1
no

## 2025-01-12 ENCOUNTER — NON-APPOINTMENT (OUTPATIENT)
Age: 45
End: 2025-01-12

## 2025-02-12 ENCOUNTER — APPOINTMENT (OUTPATIENT)
Dept: OBGYN | Facility: CLINIC | Age: 45
End: 2025-02-12
Payer: COMMERCIAL

## 2025-02-12 VITALS — DIASTOLIC BLOOD PRESSURE: 90 MMHG | SYSTOLIC BLOOD PRESSURE: 160 MMHG | BODY MASS INDEX: 44.63 KG/M2 | WEIGHT: 244 LBS

## 2025-02-12 DIAGNOSIS — E66.9 OBESITY, UNSPECIFIED: ICD-10-CM

## 2025-02-12 DIAGNOSIS — N92.6 IRREGULAR MENSTRUATION, UNSPECIFIED: ICD-10-CM

## 2025-02-12 DIAGNOSIS — Z11.3 ENCOUNTER FOR SCREENING FOR INFECTIONS WITH A PREDOMINANTLY SEXUAL MODE OF TRANSMISSION: ICD-10-CM

## 2025-02-12 DIAGNOSIS — N70.11 CHRONIC SALPINGITIS: ICD-10-CM

## 2025-02-12 PROCEDURE — 99214 OFFICE O/P EST MOD 30 MIN: CPT

## 2025-02-12 RX ORDER — LOSARTAN POTASSIUM AND HYDROCHLOROTHIAZIDE 25; 100 MG/1; MG/1
100-25 TABLET ORAL
Refills: 0 | Status: ACTIVE | COMMUNITY

## 2025-02-13 LAB
BV BACTERIA RRNA VAG QL NAA+PROBE: NOT DETECTED
C GLABRATA RNA VAG QL NAA+PROBE: NOT DETECTED
C TRACH RRNA SPEC QL NAA+PROBE: NOT DETECTED
CANDIDA RRNA VAG QL PROBE: NOT DETECTED
N GONORRHOEA RRNA SPEC QL NAA+PROBE: NOT DETECTED
T VAGINALIS RRNA SPEC QL NAA+PROBE: NOT DETECTED

## 2025-03-21 ENCOUNTER — APPOINTMENT (OUTPATIENT)
Dept: OBGYN | Facility: CLINIC | Age: 45
End: 2025-03-21
Payer: COMMERCIAL

## 2025-03-21 PROCEDURE — 99212 OFFICE O/P EST SF 10 MIN: CPT | Mod: 95

## 2025-04-14 PROBLEM — M23.91 INTERNAL DERANGEMENT OF RIGHT KNEE: Status: ACTIVE | Noted: 2025-04-14
